# Patient Record
Sex: FEMALE | Race: WHITE | NOT HISPANIC OR LATINO | Employment: OTHER | URBAN - METROPOLITAN AREA
[De-identification: names, ages, dates, MRNs, and addresses within clinical notes are randomized per-mention and may not be internally consistent; named-entity substitution may affect disease eponyms.]

---

## 2022-07-13 ENCOUNTER — HOSPITAL ENCOUNTER (EMERGENCY)
Facility: HOSPITAL | Age: 87
Discharge: HOME | End: 2022-07-13
Attending: FAMILY MEDICINE
Payer: MEDICARE

## 2022-07-13 VITALS
BODY MASS INDEX: 24.11 KG/M2 | OXYGEN SATURATION: 95 % | HEIGHT: 66 IN | WEIGHT: 150 LBS | SYSTOLIC BLOOD PRESSURE: 137 MMHG | HEART RATE: 53 BPM | TEMPERATURE: 96 F | RESPIRATION RATE: 18 BRPM | DIASTOLIC BLOOD PRESSURE: 55 MMHG

## 2022-07-13 DIAGNOSIS — S01.01XA SCALP LACERATION, INITIAL ENCOUNTER: ICD-10-CM

## 2022-07-13 DIAGNOSIS — S09.90XA INJURY OF HEAD, INITIAL ENCOUNTER: Primary | ICD-10-CM

## 2022-07-13 PROCEDURE — 99285 EMERGENCY DEPT VISIT HI MDM: CPT | Mod: 25

## 2022-07-13 PROCEDURE — 25000003 PHARM REV CODE 250: Performed by: FAMILY MEDICINE

## 2022-07-13 PROCEDURE — 12002 RPR S/N/AX/GEN/TRNK2.6-7.5CM: CPT

## 2022-07-13 PROCEDURE — 63600175 PHARM REV CODE 636 W HCPCS: Performed by: FAMILY MEDICINE

## 2022-07-13 PROCEDURE — 90715 TDAP VACCINE 7 YRS/> IM: CPT | Performed by: FAMILY MEDICINE

## 2022-07-13 PROCEDURE — 90471 IMMUNIZATION ADMIN: CPT | Performed by: FAMILY MEDICINE

## 2022-07-13 RX ORDER — LIDOCAINE AND PRILOCAINE 25; 25 MG/G; MG/G
CREAM TOPICAL ONCE
Status: COMPLETED | OUTPATIENT
Start: 2022-07-13 | End: 2022-07-13

## 2022-07-13 RX ORDER — LIDOCAINE HYDROCHLORIDE 40 MG/ML
4 SOLUTION TOPICAL
Status: DISCONTINUED | OUTPATIENT
Start: 2022-07-13 | End: 2022-07-13

## 2022-07-13 RX ORDER — LIDOCAINE 40 MG/G
CREAM TOPICAL
Status: DISCONTINUED | OUTPATIENT
Start: 2022-07-13 | End: 2022-07-13

## 2022-07-13 RX ADMIN — LIDOCAINE AND PRILOCAINE: 25; 25 CREAM TOPICAL at 08:07

## 2022-07-13 RX ADMIN — TETANUS TOXOID, REDUCED DIPHTHERIA TOXOID AND ACELLULAR PERTUSSIS VACCINE, ADSORBED 0.5 ML: 5; 2.5; 8; 8; 2.5 SUSPENSION INTRAMUSCULAR at 08:07

## 2022-07-13 NOTE — ED PROVIDER NOTES
Encounter Date: 7/13/2022       History     Chief Complaint   Patient presents with    Fall     Fell coming out the restroom this morning Lac to the back of head noted unknown loc denies blood thinners     89-year-old presents by Dolores and lacerations to her hand.  Patient says she has some other vascular chest with her walker fell to the floor hitting his head denies loss of consciousness no syncope no chest pain no shortness of breath no weakness may complaint is a headache from the fall with a laceration posterior scalp no neck pain no chest pain no hip pain or extremity pain no other signs of injury.        Review of patient's allergies indicates:   Allergen Reactions    Ace inhibitors     Chlordiazepoxide hcl     Citalopram analogues     Diphenhydramine hcl     Hydrochlorothiazide     Hydrochlorothiazide (bulk)     Losartan     Mirabegron      Other reaction(s): Dizziness    Phenobarbital     Spironolactone     Trazodone      Other reaction(s): Dizziness     Past Medical History:   Diagnosis Date    Back pain     Coronary artery disease     Depression     Diabetes mellitus     Hypertension     Mixed hyperlipidemia      Past Surgical History:   Procedure Laterality Date    APPENDECTOMY      CHOLECYSTECTOMY      CORONARY ANGIOPLASTY WITH STENT PLACEMENT      TOTAL KNEE ARTHROPLASTY       History reviewed. No pertinent family history.  Social History     Tobacco Use    Smoking status: Never Smoker    Smokeless tobacco: Never Used   Substance Use Topics    Alcohol use: Not Currently     Review of Systems   Constitutional: Negative for fever.   HENT: Negative for sore throat.    Respiratory: Negative for shortness of breath.    Cardiovascular: Negative for chest pain.   Gastrointestinal: Negative for nausea.   Genitourinary: Negative for dysuria.   Musculoskeletal: Negative for back pain.   Skin: Positive for wound. Negative for rash.   Neurological: Negative for weakness.   Hematological:  Does not bruise/bleed easily.   All other systems reviewed and are negative.      Physical Exam     Initial Vitals [07/13/22 0745]   BP Pulse Resp Temp SpO2   126/77 76 18 96.4 °F (35.8 °C) 95 %      MAP       --         Physical Exam    Nursing note and vitals reviewed.  Constitutional: She appears well-developed and well-nourished. She is active.   HENT:   Head: Normocephalic and atraumatic.   Eyes: Conjunctivae, EOM and lids are normal. Pupils are equal, round, and reactive to light.   Neck: Trachea normal and phonation normal. Neck supple. No thyroid mass present.   Normal range of motion.  Cardiovascular: Normal rate, regular rhythm, normal heart sounds and normal pulses.   Pulmonary/Chest: Breath sounds normal.   Abdominal: Abdomen is soft. Bowel sounds are normal.   Musculoskeletal:         General: Normal range of motion.      Cervical back: Normal range of motion and neck supple.     Neurological: She is alert and oriented to person, place, and time. She has normal strength and normal reflexes.   Skin: Skin is warm, dry and intact.   3 cm laceration posterior scalp with hematoma on the scalp   Psychiatric: She has a normal mood and affect. Her speech is normal and behavior is normal. Judgment and thought content normal. Cognition and memory are normal.         ED Course   Lac Repair    Date/Time: 7/13/2022 9:07 AM  Performed by: Cem Blackburn MD  Authorized by: Cem Blackburn MD     Consent:     Consent obtained:  Verbal    Consent given by:  Patient    Risks, benefits, and alternatives were discussed: yes      Risks discussed:  Infection, poor cosmetic result and poor wound healing    Alternatives discussed:  No treatment  Universal protocol:     Procedure explained and questions answered to patient or proxy's satisfaction: yes    Anesthesia:     Anesthesia method:  Topical application    Topical anesthetic:  EMLA cream  Laceration details:     Location:  Scalp    Scalp location:  Occipital     Length (cm):  3  Exploration:     Hemostasis achieved with:  Direct pressure    Wound exploration: wound explored through full range of motion      Contaminated: no    Treatment:     Area cleansed with:  Povidone-iodine and saline    Amount of cleaning:  Standard    Irrigation method:  Pressure wash    Visualized foreign bodies/material removed: no      Debridement:  None  Skin repair:     Repair method:  Staples  Approximation:     Approximation:  Close  Repair type:     Repair type:  Simple  Post-procedure details:     Dressing:  Open (no dressing)    Procedure completion:  Tolerated well, no immediate complications      Labs Reviewed - No data to display       Imaging Results          CT Cervical Spine Without Contrast (Final result)  Result time 07/13/22 08:50:39    Final result by Georgiana Jones MD (07/13/22 08:50:39)                 Impression:      1. No fractures.    2. Ligament, spinal cord and/or vascular abnormalities cannot be excluded on the basis of this examination.      Electronically signed by: Georgiana Jones  Date:    07/13/2022  Time:    08:50             Narrative:    EXAMINATION:  CT CERVICAL SPINE WITHOUT CONTRAST    CLINICAL HISTORY:  fall;    TECHNIQUE:  Axial CT images were obtained through the cervical region.    Coronal and sagittal reconstructions obtained from the axial data.    Automatic exposure control was utilized to limit radiation dose.    Contrast: None.    Radiation Dose:    Total DLP:  mGy*cm    COMPARISON:  None    FINDINGS:  Fractures: None.    Alignment: Straightening of the normal cervical lordosis.  No subluxations.    Soft tissues: No abnormalities.    Degenerative changes:    Moderate canal narrowing at C3-4 and C5-6, mild elsewhere related to disc osteophyte complexes.    Moderate right and mild left foraminal narrowing at C2-3, severe bilaterally at C3-4, moderate right and severe left at C4-5, moderate bilaterally at C5-6 and C6-7, moderate right and severe left at  C7-T1 related to uncovertebral and facet hypertrophy.    Incidental findings:    Multinodular thyroid.                               CT Head Without Contrast (Final result)  Result time 07/13/22 08:46:27    Final result by Georgiana Jones MD (07/13/22 08:46:27)                 Impression:      No acute intracranial abnormalities.      Electronically signed by: Georgiana Jones  Date:    07/13/2022  Time:    08:46             Narrative:    EXAMINATION:  CT HEAD WITHOUT CONTRAST    CLINICAL HISTORY:  head trauma;    TECHNIQUE:  Axial scans were obtained from skull base to the vertex.    Coronal and sagittal reconstructions obtained from the axial data.    Automatic exposure control was utilized to limit radiation dose.    Contrast: None    Radiation Dose:    Total DLP: 1243 mGy*cm    COMPARISON:  None    FINDINGS:  Scalp/Skull:    Right parietal scalp laceration versus scar.    No calvarial fractures.    Hyperostosis frontalis interna.    Brain sulci: Appropriate for patient's age.    Ventricles: Normal in size and configuration. No hydrocephalus.    Extra-axial spaces:    Left frontal convexity 0.8 cm partially calcified dural-based mass (series 4, image 17), likely meningioma.    Otherwise no masses or fluid collections.    Parenchyma:    Discrete and confluent supratentorial white matter hypodensities are severe nonspecific chronic microangiopathic changes, statistically chronic microvascular ischemia.    No mass, hemorrhage or CT evidence of an acute vascular insult.    Dural sinuses: No abnormal densities.    Sellar/Suprasellar region: No abnormalities.    Skull base and Craniocervical junction: No abnormalities.    Incidental findings:    Carotid siphon and vertebrobasilar atherosclerotic vascular calcifications.    Status post bilateral cataract surgery.    Right frontal sinus polyp versus retention cyst.                                 Medications   Tdap (BOOSTRIX) vaccine injection 0.5 mL (0.5 mLs Intramuscular  Given 7/13/22 0836)   LIDOcaine-prilocaine cream ( Topical (Top) Given 7/13/22 0807)                          Clinical Impression:   Final diagnoses:  [S09.90XA] Injury of head, initial encounter (Primary)  [S01.01XA] Scalp laceration, initial encounter          ED Disposition Condition    Discharge Stable        ED Prescriptions     None        Follow-up Information     Follow up With Specialties Details Why Contact Info    Godfrey Browne MD Internal Medicine In 1 week  [address] [facility]  [address] [place] [id number]  [phone]             Cem Blackburn MD  07/13/22 0966

## 2022-07-13 NOTE — DISCHARGE INSTRUCTIONS
Return to emergency room if develops any confusion or change in mental status. Have staples removed in 10 days by family doctor or ER. Wash with soap and water twice daily. Watch for signs of infection

## 2022-10-06 ENCOUNTER — HOSPITAL ENCOUNTER (EMERGENCY)
Facility: HOSPITAL | Age: 87
Discharge: HOME | End: 2022-10-06
Attending: FAMILY MEDICINE
Payer: MEDICARE

## 2022-10-06 VITALS
DIASTOLIC BLOOD PRESSURE: 74 MMHG | TEMPERATURE: 97 F | SYSTOLIC BLOOD PRESSURE: 168 MMHG | RESPIRATION RATE: 18 BRPM | OXYGEN SATURATION: 96 % | HEART RATE: 64 BPM
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
## 2022-10-06 DIAGNOSIS — S51.811A SKIN TEAR OF FOREARM WITHOUT COMPLICATION, RIGHT, INITIAL ENCOUNTER: ICD-10-CM

## 2022-10-06 PROCEDURE — 99283 EMERGENCY DEPT VISIT LOW MDM: CPT | Mod: 25

## 2022-10-06 RX ORDER — AMOXICILLIN AND CLAVULANATE POTASSIUM 875; 125 MG/1; MG/1
1 TABLET, FILM COATED ORAL 2 TIMES DAILY
Qty: 14 TABLET | Refills: 0 | Status: ON HOLD | OUTPATIENT
Start: 2022-10-06 | End: 2023-06-17

## 2022-10-06 NOTE — ED PROVIDER NOTES
Encounter Date: 10/6/2022       History     Chief Complaint   Patient presents with    Arm Injury     Amb to ED 2 states got out of bed fell right arm numerous skin tears noted bleeding controlled denies jpain      89-year-old female fell getting out of bed this morning skin tears to the right forearm hand area complains of pain in the right wrist right elbow mild-to-moderate pain began this morning no other change      Review of patient's allergies indicates:   Allergen Reactions    Ace inhibitors     Chlordiazepoxide hcl     Citalopram analogues     Diphenhydramine hcl     Hydrochlorothiazide     Hydrochlorothiazide (bulk)     Losartan     Mirabegron      Other reaction(s): Dizziness    Phenobarbital     Spironolactone     Trazodone      Other reaction(s): Dizziness     Past Medical History:   Diagnosis Date    Back pain     Coronary artery disease     Depression     Diabetes mellitus     Hypertension     Mixed hyperlipidemia      Past Surgical History:   Procedure Laterality Date    APPENDECTOMY      CHOLECYSTECTOMY      CORONARY ANGIOPLASTY WITH STENT PLACEMENT      TOTAL KNEE ARTHROPLASTY       History reviewed. No pertinent family history.  Social History     Tobacco Use    Smoking status: Never    Smokeless tobacco: Never   Substance Use Topics    Alcohol use: Not Currently     Review of Systems   Constitutional:  Negative for fever.   HENT:  Negative for sore throat.    Respiratory:  Negative for shortness of breath.    Cardiovascular:  Negative for chest pain.   Gastrointestinal:  Negative for nausea.   Genitourinary:  Negative for dysuria.   Musculoskeletal:  Negative for back pain.   Skin:  Positive for wound. Negative for rash.   Neurological:  Negative for weakness.   Hematological:  Does not bruise/bleed easily.     Physical Exam     Initial Vitals [10/06/22 0545]   BP Pulse Resp Temp SpO2   (!) 168/74 64 18 97.3 °F (36.3 °C) 96 %      MAP       --         Physical Exam    Nursing note and vitals  reviewed.  Constitutional: She appears well-developed and well-nourished. She is active.   HENT:   Head: Normocephalic and atraumatic.   Eyes: Conjunctivae, EOM and lids are normal. Pupils are equal, round, and reactive to light.   Neck: Trachea normal and phonation normal. Neck supple. No thyroid mass present.   Normal range of motion.  Cardiovascular:  Normal rate, regular rhythm, normal heart sounds and normal pulses.           Pulmonary/Chest: Breath sounds normal.   Abdominal: Abdomen is soft. Bowel sounds are normal.   Musculoskeletal:         General: Normal range of motion.      Cervical back: Normal range of motion and neck supple.     Neurological: She is alert and oriented to person, place, and time. She has normal strength and normal reflexes.   Skin: Skin is warm, dry and intact.   The skin tears to the right forearm and hand   Psychiatric: She has a normal mood and affect. Her speech is normal and behavior is normal. Judgment and thought content normal. Cognition and memory are normal.       ED Course   Procedures  Labs Reviewed - No data to display       Imaging Results              X-Ray Elbow Complete Right (Final result)  Result time 10/06/22 06:23:29      Final result by Mary Zelaya MD (10/06/22 06:23:29)                   Impression:      No acute bony abnormality      Electronically signed by: Mary Zelaya  Date:    10/06/2022  Time:    06:23               Narrative:    EXAMINATION:  XR ELBOW COMPLETE 3 VIEW RIGHT    CLINICAL HISTORY:  Unspecified fall, initial encounter    COMPARISON:  None.    FINDINGS:  There is no acute fracture identified.  There is no elbow joint effusion.  Mild soft tissue swelling is noted.                                       X-Ray Wrist Complete Right (Final result)  Result time 10/06/22 06:10:20      Final result by Mary Zelaya MD (10/06/22 06:10:20)                   Impression:      No acute bony abnormality      Electronically signed  by: Marychasity Patellory  Date:    10/06/2022  Time:    06:10               Narrative:    EXAMINATION:  XR WRIST COMPLETE 3 VIEWS RIGHT    CLINICAL HISTORY:  Unspecified fall, initial encounter    COMPARISON:  None.    FINDINGS:  There is no acute fracture or malalignment.  There are degenerative changes at the 1st CMC joint with joint space narrowing and osteophyte formation.  The soft tissues are unremarkable.                                       Medications - No data to display                           Clinical Impression:   Final diagnoses:  [W19.XXXA] Fall  [W19.XXXA] Fall, initial encounter (Primary)  [S51.811A] Skin tear of forearm without complication, right, initial encounter        ED Disposition Condition    Discharge Stable          ED Prescriptions       Medication Sig Dispense Start Date End Date Auth. Provider    amoxicillin-clavulanate 875-125mg (AUGMENTIN) 875-125 mg per tablet Take 1 tablet by mouth 2 (two) times daily. 14 tablet 10/6/2022 -- Cem Blackburn MD          Follow-up Information       Follow up With Specialties Details Why Contact Info    Godfrey Browne MD Internal Medicine In 1 day  [address] [facility]  [address]  [facility] [address]  [phone]               Cem Blackburn MD  10/06/22 0631

## 2022-10-11 ENCOUNTER — HOSPITAL ENCOUNTER (OUTPATIENT)
Dept: WOUND CARE | Facility: HOSPITAL | Age: 87
Discharge: HOME | End: 2022-10-11
Attending: PEDIATRICS
Payer: MEDICARE

## 2022-10-11 VITALS
HEART RATE: 59 BPM | DIASTOLIC BLOOD PRESSURE: 74 MMHG | SYSTOLIC BLOOD PRESSURE: 147 MMHG | RESPIRATION RATE: 18 BRPM | TEMPERATURE: 98 F | OXYGEN SATURATION: 98 %

## 2022-10-11 DIAGNOSIS — S51.811A SKIN TEAR OF RIGHT FOREARM WITHOUT COMPLICATION, INITIAL ENCOUNTER: Primary | ICD-10-CM

## 2022-10-11 DIAGNOSIS — I10 PRIMARY HYPERTENSION: ICD-10-CM

## 2022-10-11 PROCEDURE — 97597 DBRDMT OPN WND 1ST 20 CM/<: CPT | Mod: ,,, | Performed by: PEDIATRICS

## 2022-10-11 PROCEDURE — 99203 PR OFFICE/OUTPT VISIT, NEW, LEVL III, 30-44 MIN: ICD-10-PCS | Mod: 25,,, | Performed by: PEDIATRICS

## 2022-10-11 PROCEDURE — 87077 CULTURE AEROBIC IDENTIFY: CPT | Performed by: PEDIATRICS

## 2022-10-11 PROCEDURE — 97597 DBRDMT OPN WND 1ST 20 CM/<: CPT

## 2022-10-11 PROCEDURE — 99214 OFFICE O/P EST MOD 30 MIN: CPT | Mod: 25

## 2022-10-11 PROCEDURE — 97597 DEBRIDEMENT: ICD-10-PCS | Mod: ,,, | Performed by: PEDIATRICS

## 2022-10-11 PROCEDURE — 99203 OFFICE O/P NEW LOW 30 MIN: CPT | Mod: 25,,, | Performed by: PEDIATRICS

## 2022-10-11 PROCEDURE — 99203 OFFICE O/P NEW LOW 30 MIN: CPT | Mod: 25

## 2022-10-11 RX ORDER — ATORVASTATIN CALCIUM 20 MG/1
TABLET, FILM COATED ORAL
Status: ON HOLD | COMMUNITY
End: 2023-06-17

## 2022-10-11 RX ORDER — LIDOCAINE HYDROCHLORIDE 20 MG/ML
JELLY TOPICAL
Status: DISPENSED
Start: 2022-10-11 | End: 2022-10-11

## 2022-10-11 RX ORDER — AMLODIPINE BESYLATE 10 MG/1
TABLET ORAL
Status: ON HOLD | COMMUNITY
End: 2023-06-18 | Stop reason: HOSPADM

## 2022-10-11 RX ORDER — ALISKIREN 300 MG/1
300 TABLET, FILM COATED ORAL DAILY
COMMUNITY
Start: 2022-09-27

## 2022-10-11 RX ORDER — DONEPEZIL HYDROCHLORIDE 5 MG/1
TABLET, FILM COATED ORAL
COMMUNITY

## 2022-10-11 RX ORDER — METOPROLOL SUCCINATE 25 MG/1
TABLET, EXTENDED RELEASE ORAL
Status: ON HOLD | COMMUNITY
End: 2023-06-17

## 2022-10-11 NOTE — PATIENT INSTRUCTIONS
Leave dressing in place until you return for re-evaluation/dressing change on Friday in wound center  Reinforce dressing if needed    Continue taking Augmentin as prescribed.   A culture was performed in wound care center today.  Antibiotics may be changed if needed when final culture results returned.

## 2022-10-11 NOTE — PROGRESS NOTES
Subjective:       Patient ID: Shanita Chino is a 89 y.o. female.    Chief Complaint: Wound Consult (Skin tears to R forearm after fall on 10/6/22.  Seen at [facility] ER on 10/6/22, started on Augmentin at that time.  Here for evaluation and treatment)    HPI  Review of Systems      Objective:      Temp:  [97.5 °F (36.4 °C)]   Pulse:  [59]   Resp:  [18]   BP: (147)/(74)   SpO2:  [98 %]   Physical Exam       Altered Skin Integrity 10/11/22 0926 Right proximal;lower Arm Skin Tear (Active)   10/11/22 0926   Altered Skin Integrity Present on Admission:    Side: Right   Orientation: proximal;lower   Location: Arm   Wound Number:    Is this injury device related?: No   Primary Wound Type: Skin tear   Description of Altered Skin Integrity:    Ankle-Brachial Index:    Pulses:    Removal Indication and Assessment:    Wound Outcome:    (Retired) Wound Length (cm):    (Retired) Wound Width (cm):    (Retired) Depth (cm):    Wound Description (Comments):    Removal Indications:    Wound Image   10/11/22 0936   Dressing Appearance Moist drainage 10/11/22 0936   Drainage Amount Moderate 10/11/22 0936   Drainage Characteristics/Odor Tan;Malodorous;Bleeding controlled 10/11/22 0936   Appearance Red;Not granulating;White;Tan;Fibrin;Ecchymotic 10/11/22 0936   Periwound Area Ecchymotic;Other (see comments) 10/11/22 0936   Wound Edges Irregular;Rolled/closed 10/11/22 0936   Wound Length (cm) 4.5 cm 10/11/22 0936   Wound Width (cm) 5 cm 10/11/22 0936   Wound Depth (cm) 0.1 cm 10/11/22 0936   Wound Volume (cm^3) 2.25 cm^3 10/11/22 0936   Wound Surface Area (cm^2) 22.5 cm^2 10/11/22 0936   Care Cleansed with:;Soap and water;Applied:;Other (see comments) 10/11/22 0936            Altered Skin Integrity 10/11/22 0926 Right lower;distal Arm (Active)   10/11/22 0926   Altered Skin Integrity Present on Admission:    Side: Right   Orientation: lower;distal   Location: Arm   Wound Number:    Is this injury device related?:    Primary Wound Type:     Description of Altered Skin Integrity:    Ankle-Brachial Index:    Pulses:    Removal Indication and Assessment:    Wound Outcome:    (Retired) Wound Length (cm):    (Retired) Wound Width (cm):    (Retired) Depth (cm):    Wound Description (Comments):    Removal Indications:    Wound Image   10/11/22 0936   Dressing Appearance Moist drainage 10/11/22 0936   Drainage Amount Moderate 10/11/22 0936   Drainage Characteristics/Odor Tan;Malodorous;Bleeding controlled 10/11/22 0936   Appearance Red;Not granulating;Tan;White;Fibrin;Ecchymotic 10/11/22 0936   Periwound Area Ecchymotic;Other (see comments) 10/11/22 0936   Wound Edges Irregular;Rolled/closed 10/11/22 0936   Wound Length (cm) 8.7 cm 10/11/22 0936   Wound Width (cm) 3.5 cm 10/11/22 0936   Wound Depth (cm) 0.1 cm 10/11/22 0936   Wound Volume (cm^3) 3.045 cm^3 10/11/22 0936   Wound Surface Area (cm^2) 30.45 cm^2 10/11/22 0936   Care Cleansed with:;Soap and water;Applied:;Other (see comments) 10/11/22 0936         Assessment:     Patient arrives today with new injury to right forearm.  These are to skin tears that she acquired after falling out of bed on 10/06/2022.  These were seen in the emergency room area was cleaned and Adaptic and Steri-Strips were applied with Tegaderm.  Today the area is unwrapped and there was some odor and Biofilm that was noted.  There was some edges that were rolled in both skin tears.  Xylocaine was applied to the wounds and these were cleaned the Biofilm removed.  Because of the rolled edges of the skin A selective debridement was done and the nonviable skin was removed.  See procedure note.  Patient will continue on current antibiotics.  A new wound culture was taken.  Area was cleaned and Aquacel Ag was applied to the open areas and foam dressing was applied.  Patient will return in 3 days for a dressing change and in 1 week with for physician visit.    ICD-10-CM ICD-9-CM   1. Skin tear of right forearm without complication,  initial encounter  S51.811A 881.00   2. Primary hypertension  I10 401.9         Plan:   Tissue pathology and/or culture taken:  [x] Yes [] No   Sharp debridement performed:   [x] Yes [] No   Labs ordered this visit:   [] Yes [x] No   Imaging ordered this visit:   [] Yes [x] No           Orders Placed This Encounter   Procedures    Wound Culture    Change dressing     Cleanse wound with: wound cleanser, NS or soap and water  Lidocaine: 2% topical gel prn prior to wound care   Silver nitrate: prn   Periwound care: skin prep periwound prn   Primary dressing: aquacel ag  Secondary dressing: foam dressing    Frequency: change twice weekly     Standing Status:   Standing     Number of Occurrences:   6     Standing Expiration Date:   12/11/2022        Follow up in about 3 days (around 10/14/2022) for Nurse visit Friday, md visit next Tuesday.

## 2022-10-11 NOTE — PROCEDURES
"Debridement    Date/Time: 10/11/2022 9:08 AM  Performed by: Randy Ross MD  Authorized by: Randy Ross MD     Time out: Immediately prior to procedure a "time out" was called to verify the correct patient, procedure, equipment, support staff and site/side marked as required.    Consent Done?:  Yes (Written)    Preparation: Patient was prepped and draped in usual sterile fashion    Local anesthesia used?: Yes    Anesthesia:  Local infiltration  Local anesthetic:  Topical anesthetic    Wound Details:    Location:  Right arm    Type of Debridement:  Non-excisional       Length (cm):  8       Area (sq cm):  16       Width (cm):  2       Percent Debrided (%):  50       Depth (cm):  0.1       Total Area Debrided (sq cm):  8    Depth of debridement:  Epidermis/Dermis    Tissue debrided:  Epidermis and Dermis    Devitalized tissue debrided:  Other    Instruments:  Forceps and Scissors    Bleeding:  Minimal  Hemostasis Achieved: Yes    Method Used:  Pressure  Patient tolerance:  Patient tolerated the procedure well with no immediate complications  "

## 2022-10-13 DIAGNOSIS — S51.811A SKIN TEAR OF RIGHT FOREARM WITHOUT COMPLICATION: Primary | ICD-10-CM

## 2022-10-13 LAB — BACTERIA SPEC CULT: ABNORMAL

## 2022-10-13 RX ORDER — SULFAMETHOXAZOLE AND TRIMETHOPRIM 800; 160 MG/1; MG/1
1 TABLET ORAL 2 TIMES DAILY
Qty: 20 TABLET | Refills: 0 | Status: SHIPPED | OUTPATIENT
Start: 2022-10-13 | End: 2022-10-23

## 2022-10-13 NOTE — PROGRESS NOTES
Culture results returned.  Resistant to Augmentin. Verbal order given for pt to d/c Augmentin,  Bactrim Ds and take BID x 10 days per Dr. Ross.  Sent electronically to pharmacy.  Notified Charlene of medication change per telephone.  Verbalized understanding.

## 2022-10-14 ENCOUNTER — HOSPITAL ENCOUNTER (OUTPATIENT)
Dept: WOUND CARE | Facility: HOSPITAL | Age: 87
Discharge: HOME | End: 2022-10-14
Attending: PEDIATRICS
Payer: MEDICARE

## 2022-10-14 VITALS
RESPIRATION RATE: 18 BRPM | OXYGEN SATURATION: 96 % | DIASTOLIC BLOOD PRESSURE: 62 MMHG | TEMPERATURE: 99 F | HEART RATE: 55 BPM | SYSTOLIC BLOOD PRESSURE: 126 MMHG

## 2022-10-14 DIAGNOSIS — S51.811A SKIN TEAR OF RIGHT FOREARM WITHOUT COMPLICATION, INITIAL ENCOUNTER: ICD-10-CM

## 2022-10-14 PROCEDURE — 99213 OFFICE O/P EST LOW 20 MIN: CPT

## 2022-10-14 NOTE — PROGRESS NOTES
[facility]  [address] [place] [address] [place] [address]      Subjective:     Patient seen in clinic today.  Dressing changed as ordered.      Assessment:          Altered Skin Integrity 10/11/22 0926 Right proximal;lower Arm Skin Tear (Active)   10/11/22 0926   Altered Skin Integrity Present on Admission:    Side: Right   Orientation: proximal;lower   Location: Arm   Wound Number:    Is this injury device related?: No   Primary Wound Type: Skin tear   Description of Altered Skin Integrity:    Ankle-Brachial Index:    Pulses:    Removal Indication and Assessment:    Wound Outcome:    (Retired) Wound Length (cm):    (Retired) Wound Width (cm):    (Retired) Depth (cm):    Wound Description (Comments):    Removal Indications:    Wound Image   10/14/22 0912   Dressing Appearance Moist drainage;Intact 10/14/22 0912   Drainage Amount Small 10/14/22 0912   Drainage Characteristics/Odor Serosanguineous;No odor 10/14/22 0912   Appearance Red;Slough;Granulating 10/14/22 0912   Periwound Area Ecchymotic 10/14/22 0912   Wound Edges Irregular 10/14/22 0912   Wound Length (cm) 3.5 cm 10/14/22 0912   Wound Width (cm) 3.3 cm 10/14/22 0912   Wound Depth (cm) 0.1 cm 10/14/22 0912   Wound Volume (cm^3) 1.155 cm^3 10/14/22 0912   Wound Surface Area (cm^2) 11.55 cm^2 10/14/22 0912   Care Cleansed with:;Antimicrobial agent;Sterile normal saline 10/14/22 0912   Dressing Applied;Non-adherent;Hydrofiber;Silver;Foam 10/14/22 0912            Altered Skin Integrity 10/11/22 0926 Right lower;distal Arm (Active)   10/11/22 0926   Altered Skin Integrity Present on Admission:    Side: Right   Orientation: lower;distal   Location: Arm   Wound Number:    Is this injury device related?:    Primary Wound Type:    Description of Altered Skin Integrity:    Ankle-Brachial Index:    Pulses:    Removal Indication and Assessment:    Wound Outcome:    (Retired) Wound Length (cm):    (Retired) Wound Width (cm):    (Retired) Depth  (cm):    Wound Description (Comments):    Removal Indications:    Wound Image    10/14/22 0912   Dressing Appearance Intact;Moist drainage 10/14/22 0912   Drainage Amount Small 10/14/22 0912   Drainage Characteristics/Odor Serosanguineous;No odor 10/14/22 0912   Appearance Red;Slough;Granulating;Other (see comments) 10/14/22 0912   Periwound Area Ecchymotic 10/14/22 0912   Wound Edges Irregular 10/14/22 0912   Wound Length (cm) 7.9 cm 10/14/22 0912   Wound Width (cm) 3.2 cm 10/14/22 0912   Wound Depth (cm) 0.1 cm 10/14/22 0912   Wound Volume (cm^3) 2.528 cm^3 10/14/22 0912   Wound Surface Area (cm^2) 25.28 cm^2 10/14/22 0912   Care Cleansed with:;Sterile normal saline;Antimicrobial agent 10/14/22 0912   Dressing Applied;Hydrocolloid;Sodium chloride impregnated;Non-adherent;Foam 10/14/22 0912         ICD-10-CM ICD-9-CM   1. Skin tear of right forearm without complication, initial encounter  S51.811A 881.00         Plan:           Orders Placed This Encounter   Procedures    Change dressing     Cleanse wound with: wound cleanser, NS or soap and water  Lidocaine: 2% topical gel prn prior to wound care   Silver nitrate: prn   Periwound care: skin prep periwound prn   Primary dressing: aquacel ag  Secondary dressing: foam dressing    Frequency: change twice weekly     Standing Status:   Standing     Number of Occurrences:   1

## 2022-10-14 NOTE — PATIENT INSTRUCTIONS
Leave dressing in place until you return for re-evaluation/dressing change on Friday in wound center  Reinforce dressing if needed  Continue taking Bactrim as directed until complete.

## 2022-10-18 ENCOUNTER — HOSPITAL ENCOUNTER (OUTPATIENT)
Dept: WOUND CARE | Facility: HOSPITAL | Age: 87
Discharge: HOME | End: 2022-10-18
Attending: PEDIATRICS
Payer: MEDICARE

## 2022-10-18 VITALS
RESPIRATION RATE: 18 BRPM | DIASTOLIC BLOOD PRESSURE: 71 MMHG | SYSTOLIC BLOOD PRESSURE: 143 MMHG | OXYGEN SATURATION: 95 % | TEMPERATURE: 98 F | HEART RATE: 58 BPM

## 2022-10-18 DIAGNOSIS — S51.811A SKIN TEAR OF RIGHT FOREARM WITHOUT COMPLICATION, INITIAL ENCOUNTER: ICD-10-CM

## 2022-10-18 DIAGNOSIS — S51.811S SKIN TEAR OF RIGHT FOREARM WITHOUT COMPLICATION, SEQUELA: Primary | ICD-10-CM

## 2022-10-18 PROCEDURE — 99213 OFFICE O/P EST LOW 20 MIN: CPT | Mod: ,,, | Performed by: PEDIATRICS

## 2022-10-18 PROCEDURE — 99213 OFFICE O/P EST LOW 20 MIN: CPT

## 2022-10-18 PROCEDURE — 99213 PR OFFICE/OUTPT VISIT, EST, LEVL III, 20-29 MIN: ICD-10-PCS | Mod: ,,, | Performed by: PEDIATRICS

## 2022-10-18 NOTE — PROGRESS NOTES
Subjective:       Patient ID: Shanita Chino is a 89 y.o. female.    Chief Complaint: Non-healing Wound Follow Up (Infected R forearm skin tear, follow up with md for re-evaluation and treatment)    HPI  Review of Systems      Objective:      Temp:  [97.5 °F (36.4 °C)]   Pulse:  [58]   Resp:  [18]   BP: (143)/(71)   SpO2:  [95 %]   Physical Exam       Altered Skin Integrity 10/11/22 0926 Right proximal;lower Arm Skin Tear (Active)   10/11/22 0926   Altered Skin Integrity Present on Admission:    Side: Right   Orientation: proximal;lower   Location: Arm   Wound Number:    Is this injury device related?: No   Primary Wound Type: Skin tear   Description of Altered Skin Integrity:    Ankle-Brachial Index:    Pulses:    Removal Indication and Assessment:    Wound Outcome:    (Retired) Wound Length (cm):    (Retired) Wound Width (cm):    (Retired) Depth (cm):    Wound Description (Comments):    Removal Indications:    Wound Image   10/18/22 1026   Dressing Appearance Moist drainage;Intact 10/18/22 1026   Drainage Amount Small 10/18/22 1026   Drainage Characteristics/Odor Serosanguineous 10/18/22 1026   Appearance Red;Epithelialization;Granulating 10/18/22 1026   Periwound Area Ecchymotic 10/18/22 1026   Wound Edges Irregular 10/18/22 1026   Wound Length (cm) 3 cm 10/18/22 1026   Wound Width (cm) 2.3 cm 10/18/22 1026   Wound Depth (cm) 0.1 cm 10/18/22 1026   Wound Volume (cm^3) 0.69 cm^3 10/18/22 1026   Wound Surface Area (cm^2) 6.9 cm^2 10/18/22 1026   Care Cleansed with:;Antimicrobial agent;Sterile normal saline 10/18/22 1026   Dressing Applied;Collagen;Non-adherent;Foam 10/18/22 1026            Altered Skin Integrity 10/11/22 0926 Right lower;distal Arm (Active)   10/11/22 0926   Altered Skin Integrity Present on Admission:    Side: Right   Orientation: lower;distal   Location: Arm   Wound Number:    Is this injury device related?:    Primary Wound Type:    Description of Altered Skin Integrity:    Ankle-Brachial  Index:    Pulses:    Removal Indication and Assessment:    Wound Outcome:    (Retired) Wound Length (cm):    (Retired) Wound Width (cm):    (Retired) Depth (cm):    Wound Description (Comments):    Removal Indications:    Wound Image   10/18/22 1026   Dressing Appearance Intact;Moist drainage 10/18/22 1026   Drainage Amount Small 10/18/22 1026   Drainage Characteristics/Odor Serosanguineous 10/18/22 1026   Appearance Red;Granulating 10/18/22 1026   Periwound Area Ecchymotic 10/18/22 1026   Wound Edges Irregular 10/18/22 1026   Wound Length (cm) 8.3 cm 10/18/22 1026   Wound Width (cm) 1.2 cm 10/18/22 1026   Wound Depth (cm) 0.1 cm 10/18/22 1026   Wound Volume (cm^3) 0.996 cm^3 10/18/22 1026   Wound Surface Area (cm^2) 9.96 cm^2 10/18/22 1026   Care Cleansed with:;Antimicrobial agent;Sterile normal saline 10/18/22 1026   Dressing Applied;Collagen;Foam;Non-adherent 10/18/22 1026         Assessment:     Today all 3 areas of skin avulsion are improving.  All wounds are well granulated.  There is good adherence of the edges of the wounds.  Area was cleaned and collagen and Adaptic applied with foam dressing.  Patient is currently taking her antibiotics.  There was no drainage today.  Patient return in 3 days for dressing change and in 1 week for physician visit.    ICD-10-CM ICD-9-CM   1. Skin tear of right forearm without complication, initial encounter  S51.811A 881.00         Plan:   Tissue pathology and/or culture taken:  [] Yes [x] No   Sharp debridement performed:   [] Yes [x] No   Labs ordered this visit:   [] Yes [x] No   Imaging ordered this visit:   [] Yes [x] No           Orders Placed This Encounter   Procedures    Change dressing     Cleanse wound with: wound cleanser, NS or soap and water  Lidocaine: 2% topical gel prn prior to wound care   Silver nitrate: prn   Periwound care: skin prep periwound prn   Primary dressing: collagen/adaptic  Secondary dressing: foam dressing    Frequency: change twice weekly      Standing Status:   Standing     Number of Occurrences:   1        Follow up in about 3 days (around 10/21/2022) for nurse visit, md visit 1 week.

## 2022-10-18 NOTE — PATIENT INSTRUCTIONS
Cleanse wound with: wound cleanser, NS or soap and water   Lidocaine: 2% topical gel prn prior to wound care   Silver nitrate: prn   Periwound care: skin prep periwound prn   Primary dressing: aquacel ag   Secondary dressing: foam dressing

## 2022-10-21 ENCOUNTER — HOSPITAL ENCOUNTER (OUTPATIENT)
Dept: WOUND CARE | Facility: HOSPITAL | Age: 87
Discharge: HOME | End: 2022-10-21
Attending: PEDIATRICS
Payer: MEDICARE

## 2022-10-21 VITALS
DIASTOLIC BLOOD PRESSURE: 61 MMHG | HEART RATE: 57 BPM | TEMPERATURE: 98 F | OXYGEN SATURATION: 95 % | SYSTOLIC BLOOD PRESSURE: 119 MMHG | RESPIRATION RATE: 18 BRPM

## 2022-10-21 DIAGNOSIS — S51.811A SKIN TEAR OF RIGHT FOREARM WITHOUT COMPLICATION, INITIAL ENCOUNTER: ICD-10-CM

## 2022-10-21 PROCEDURE — 99212 OFFICE O/P EST SF 10 MIN: CPT

## 2022-10-21 NOTE — PROGRESS NOTES
[facility]  [address] [place] [address]      Subjective:     Patient seen in clinic today.  Dressing changed as ordered.      Assessment:          Altered Skin Integrity 10/11/22 0926 Right proximal;lower Arm Skin Tear (Active)   10/11/22 0926   Altered Skin Integrity Present on Admission:    Side: Right   Orientation: proximal;lower   Location: Arm   Wound Number:    Is this injury device related?: No   Primary Wound Type: Skin tear   Description of Altered Skin Integrity:    Ankle-Brachial Index:    Pulses:    Removal Indication and Assessment:    Wound Outcome:    (Retired) Wound Length (cm):    (Retired) Wound Width (cm):    (Retired) Depth (cm):    Wound Description (Comments):    Removal Indications:    Wound Image   10/21/22 1018   Dressing Appearance Intact;Moist drainage 10/21/22 1018   Drainage Amount Small 10/21/22 1018   Drainage Characteristics/Odor Serous;No odor 10/21/22 1018   Appearance Red;Not granulating 10/21/22 1018   Periwound Area Intact 10/21/22 1018   Wound Edges Defined 10/21/22 1018   Wound Length (cm) 1 cm 10/21/22 1018   Wound Width (cm) 0.6 cm 10/21/22 1018   Wound Depth (cm) 0.1 cm 10/21/22 1018   Wound Volume (cm^3) 0.06 cm^3 10/21/22 1018   Wound Surface Area (cm^2) 0.6 cm^2 10/21/22 1018   Care Cleansed with:;Sterile normal saline 10/21/22 1018   Dressing Applied;Collagen;Non-adherent;Foam 10/21/22 1018            Altered Skin Integrity 10/11/22 0926 Right lower;distal Arm (Active)   10/11/22 0926   Altered Skin Integrity Present on Admission:    Side: Right   Orientation: lower;distal   Location: Arm   Wound Number:    Is this injury device related?:    Primary Wound Type:    Description of Altered Skin Integrity:    Ankle-Brachial Index:    Pulses:    Removal Indication and Assessment:    Wound Outcome:    (Retired) Wound Length (cm):    (Retired) Wound Width (cm):    (Retired) Depth (cm):    Wound Description (Comments):    Removal  Indications:    Wound Image   10/21/22 1018   Dressing Appearance Intact;Moist drainage 10/21/22 1018   Drainage Amount Small 10/21/22 1018   Drainage Characteristics/Odor Serosanguineous;No odor 10/21/22 1018   Appearance Red;Not granulating 10/21/22 1018   Periwound Area Intact 10/21/22 1018   Wound Edges Defined 10/21/22 1018   Wound Length (cm) 6.5 cm 10/21/22 1018   Wound Width (cm) 1 cm 10/21/22 1018   Wound Depth (cm) 0.1 cm 10/21/22 1018   Wound Volume (cm^3) 0.65 cm^3 10/21/22 1018   Wound Surface Area (cm^2) 6.5 cm^2 10/21/22 1018   Care Cleansed with:;Sterile normal saline 10/21/22 1018   Dressing Applied;Collagen;Non-adherent;Foam 10/21/22 1018         ICD-10-CM ICD-9-CM   1. Skin tear of right forearm without complication, initial encounter  S51.811A 881.00            Orders Placed This Encounter   Procedures    Change dressing     Cleanse wound with: wound cleanser, NS or soap and water  Lidocaine: 2% topical gel prn prior to wound care   Silver nitrate: prn   Periwound care: skin prep periwound prn   Primary dressing: collagen  Secondary dressing: foam dressing    Frequency: change twice weekly     Standing Status:   Standing     Number of Occurrences:   1     Standing Expiration Date:   10/21/2022

## 2022-10-21 NOTE — PATIENT INSTRUCTIONS
Cleanse wound with: wound cleanser, NS or soap and water   Lidocaine: 2% topical gel prn prior to wound care   Silver nitrate: prn   Periwound care: skin prep periwound prn   Primary dressing: aquacel ag   Secondary dressing: foam dressing     Frequency: change twice weekly

## 2022-10-23 ENCOUNTER — HOSPITAL ENCOUNTER (EMERGENCY)
Facility: HOSPITAL | Age: 87
Discharge: HOME | End: 2022-10-23
Attending: SPECIALIST
Payer: MEDICARE

## 2022-10-23 DIAGNOSIS — S02.2XXA CLOSED FRACTURE OF NASAL BONE, INITIAL ENCOUNTER: ICD-10-CM

## 2022-10-23 DIAGNOSIS — S02.401A CLOSED FRACTURE OF MAXILLARY SINUS, INITIAL ENCOUNTER: ICD-10-CM

## 2022-10-23 DIAGNOSIS — S01.21XA COMPLEX LACERATION OF NOSE, INITIAL ENCOUNTER: Primary | ICD-10-CM

## 2022-10-23 PROCEDURE — 12014 RPR F/E/E/N/L/M 5.1-7.5 CM: CPT

## 2022-10-23 PROCEDURE — 99285 EMERGENCY DEPT VISIT HI MDM: CPT | Mod: 25

## 2022-10-23 PROCEDURE — 63600175 PHARM REV CODE 636 W HCPCS: Performed by: SPECIALIST

## 2022-10-23 PROCEDURE — 90715 TDAP VACCINE 7 YRS/> IM: CPT | Performed by: SPECIALIST

## 2022-10-23 PROCEDURE — 90471 IMMUNIZATION ADMIN: CPT | Performed by: SPECIALIST

## 2022-10-23 PROCEDURE — 25000003 PHARM REV CODE 250: Performed by: SPECIALIST

## 2022-10-23 RX ORDER — OXYMETAZOLINE HCL 0.05 %
1 SPRAY, NON-AEROSOL (ML) NASAL
Status: COMPLETED | OUTPATIENT
Start: 2022-10-23 | End: 2022-10-23

## 2022-10-23 RX ORDER — TRAMADOL HYDROCHLORIDE 50 MG/1
50 TABLET ORAL EVERY 6 HOURS PRN
Qty: 20 TABLET | Refills: 0 | Status: ON HOLD | OUTPATIENT
Start: 2022-10-23 | End: 2023-06-17

## 2022-10-23 RX ORDER — TRAMADOL HYDROCHLORIDE 50 MG/1
50 TABLET ORAL
Status: COMPLETED | OUTPATIENT
Start: 2022-10-23 | End: 2022-10-23

## 2022-10-23 RX ADMIN — Medication 1 SPRAY: at 10:10

## 2022-10-23 RX ADMIN — TETANUS TOXOID, REDUCED DIPHTHERIA TOXOID AND ACELLULAR PERTUSSIS VACCINE, ADSORBED 0.5 ML: 5; 2.5; 8; 8; 2.5 SUSPENSION INTRAMUSCULAR at 08:10

## 2022-10-23 RX ADMIN — TRAMADOL HYDROCHLORIDE 50 MG: 50 TABLET, COATED ORAL at 10:10

## 2022-10-24 VITALS
HEART RATE: 62 BPM | TEMPERATURE: 98 F | RESPIRATION RATE: 16 BRPM | DIASTOLIC BLOOD PRESSURE: 66 MMHG | SYSTOLIC BLOOD PRESSURE: 134 MMHG | OXYGEN SATURATION: 95 %

## 2022-10-24 NOTE — ED PROVIDER NOTES
Encounter Date: 10/23/2022       History     Chief Complaint   Patient presents with    Fall     Patient awake alert oriented to name and place only Hx Dementia sttes reached for the light and fell hitting face on bricks large amt of bleeding noted on dressing and clothing denies chest pain or dizziness prior to fall      Patient lost her balance while going outside to change a light bulb when she reached up she fell over falling onto her face on bricks; she presents with a large laceration to her nose with bleeding; questionable loss of consciousness; patient with dementia but is oriented to her name and place; no blood thinners    The history is provided by the patient and a relative.   Review of patient's allergies indicates:   Allergen Reactions    Ace inhibitors     Aspirin     Chlordiazepoxide hcl     Citalopram analogues     Diphenhydramine hcl     Hydrochlorothiazide     Hydrochlorothiazide (bulk)     Losartan     Mirabegron      Other reaction(s): Dizziness    Phenobarbital     Spironolactone     Trazodone      Other reaction(s): Dizziness     Past Medical History:   Diagnosis Date    Back pain     Coronary artery disease     Depression     Diabetes mellitus     Hypertension     Mixed hyperlipidemia      Past Surgical History:   Procedure Laterality Date    APPENDECTOMY      CHOLECYSTECTOMY      CORONARY ANGIOPLASTY WITH STENT PLACEMENT      TOTAL KNEE ARTHROPLASTY       No family history on file.  Social History     Tobacco Use    Smoking status: Never    Smokeless tobacco: Never   Substance Use Topics    Alcohol use: Not Currently     Review of Systems   Constitutional: Negative.    HENT:  Positive for hearing loss.    Respiratory: Negative.     Cardiovascular: Negative.    Gastrointestinal: Negative.    Musculoskeletal:  Positive for back pain.   Skin: Negative.    Neurological: Negative.         Memory loss   All other systems reviewed and are negative.    Physical Exam     Initial Vitals   BP Pulse  Resp Temp SpO2   10/23/22 2012 10/23/22 2012 10/23/22 2012 10/23/22 2016 10/23/22 2012   (!) 158/83 109 20 97.8 °F (36.6 °C) 95 %      MAP       --                Physical Exam    Nursing note and vitals reviewed.  Constitutional: She appears well-developed and well-nourished.   HENT:   Head: Normocephalic.       Large lacerations/flap to her nose with bleeding   Eyes: EOM are normal. Pupils are equal, round, and reactive to light.   Neck: Neck supple.   Normal range of motion.  Cardiovascular:  Normal rate, regular rhythm, normal heart sounds and intact distal pulses.           Pulmonary/Chest: Breath sounds normal.   Abdominal: Abdomen is soft. Bowel sounds are normal.   Musculoskeletal:         General: Normal range of motion.      Cervical back: Normal range of motion and neck supple.     Neurological: She is alert and oriented to person, place, and time. She has normal strength.   Skin: Skin is warm and dry.       ED Course   Lac Repair    Date/Time: 10/24/2022 1:23 AM  Performed by: Abhi De Jesus MD  Authorized by: Abhi De Jesus MD     Consent:     Consent obtained:  Verbal    Consent given by:  Patient    Risks, benefits, and alternatives were discussed: yes      Risks discussed:  Infection, need for additional repair, poor wound healing and poor cosmetic result    Alternatives discussed:  Delayed treatment and referral  Universal protocol:     Procedure explained and questions answered to patient or proxy's satisfaction: yes      Patient identity confirmed:  Verbally with patient  Anesthesia:     Anesthesia method:  Local infiltration    Local anesthetic:  Lidocaine 1% w/o epi  Laceration details:     Location:  Face    Face location:  Nose (Complex laceration running from the right nasal labial fold just medial to the tear duct up to the nasal bridge and down the left nasal labial fold)    Length (cm):  6    Depth (mm):  3  Pre-procedure details:     Preparation:  Patient was prepped and draped in usual  sterile fashion  Exploration:     Hemostasis achieved with:  Tied off vessels (Used 4-0 Vicryl to tie off and exposed artery, bleeding controlled)    Wound extent: vascular damage      Contaminated: no    Treatment:     Area cleansed with:  Povidone-iodine    Amount of cleaning:  Standard    Irrigation solution:  Sterile saline    Irrigation method:  Syringe    Visualized foreign bodies/material removed: no      Debridement:  None  Skin repair:     Repair method:  Sutures    Suture size:  3-0    Suture material:  Nylon    Suture technique:  Simple interrupted    Number of sutures:  16  Approximation:     Approximation:  Close  Repair type:     Repair type:  Intermediate  Post-procedure details:     Dressing:  Non-adherent dressing and sterile dressing    Procedure completion:  Tolerated well, no immediate complications  Labs Reviewed - No data to display       Imaging Results              CT Head Without Contrast (Final result)  Result time 10/23/22 21:00:36      Final result by Gabriel Osborne MD (10/23/22 21:00:36)                   Impression:      1. No acute intracranial abnormality.  Changes of microangiopathy and age-appropriate volume loss.  2. Bilateral comminuted nasal bone fractures with deviation as well as fracture of the maxillary sinus on the right as well as maxilla.  Hemosinus is identified.      Electronically signed by: Gabriel Osborne MD  Date:    10/23/2022  Time:    21:00               Narrative:    EXAMINATION:  CT HEAD WITHOUT CONTRAST    CLINICAL HISTORY:  Head trauma, moderate-severe;    TECHNIQUE:  Low dose axial CT images obtained throughout the head without intravenous contrast. Sagittal and coronal reconstructions were performed.    COMPARISON:  None.    FINDINGS:  Intracranial compartment:    Ventricles and sulci are normal in size for age without evidence of hydrocephalus. No extra-axial blood or fluid collections.    The brain parenchyma appears normal. No parenchymal mass, hemorrhage,  edema or major vascular distribution infarct.    Skull/extracranial contents (limited evaluation): Comminuted fracture of the nasal bones are identified with deviation of the nose to the left.  Likely fracture of the medial and lateral maxillary sinuses.  The orbital walls appear grossly intact.  Likely complex fracture of the anterior aspect of the right maxilla is identified.  Hemosinus is noted.  Mastoid air cells and paranasal sinuses are essentially clear.                                       Medications   Tdap (BOOSTRIX) vaccine injection 0.5 mL (0.5 mLs Intramuscular Given 10/23/22 2019)   oxymetazoline 0.05 % nasal spray 1 spray (1 spray Each Nostril Given 10/23/22 2254)   traMADoL tablet 50 mg (50 mg Oral Given 10/23/22 2254)                     Patient Vitals for the past 24 hrs:   BP Temp Temp src Pulse Resp SpO2   10/23/22 2257 -- -- -- 62 -- 95 %   10/23/22 2254 -- -- -- -- 16 --   10/23/22 2157 134/66 -- -- (!) 57 -- (!) 93 %   10/23/22 2016 -- 97.8 °F (36.6 °C) Oral -- -- --   10/23/22 2012 (!) 158/83 -- -- 109 20 95 %   Patient and daughter understand the need to follow-up with ENT tomorrow and possibly ocular plastics due to the area involved near the tear duct of the right eye       The patient is resting comfortably and in no acute distress.  She states that her symptoms have improved after treatment in Emergency Department. I personally discussed her test results and treatment plan.  Gave strict ED precautions.  Specific conditions for return to the emergency department and importance of follow up with her primary care provided or the physician listed on the discharge instructions.  Patient voices understanding and agrees to the plan discussed. All patients' questions have been answered at this time.   She has remained hemodynamically stable throughout entire stay in ED and is stable for discharge home.     Clinical Impression:   Final diagnoses:  [S01.21XA] Complex laceration of nose, initial  encounter (Primary)  [S02.2XXA] Closed fracture of nasal bone, initial encounter  [S02.401A] Closed fracture of maxillary sinus, initial encounter        ED Disposition Condition    Discharge Stable          ED Prescriptions       Medication Sig Dispense Start Date End Date Auth. Provider    traMADoL (ULTRAM) 50 mg tablet Take 1 tablet (50 mg total) by mouth every 6 (six) hours as needed for Pain. 20 tablet 10/23/2022 -- Abhi De Jesus MD          Follow-up Information       Follow up With Specialties Details Why Contact Info    Chandra Joyner MD Otolaryngology Call in 1 day Need to see tommorr [address] [facility] [address]  [phone]      [facility] - Emergency Dept Emergency Medicine  As needed [address] [address] [phone]             Abhi De Jesus MD  10/24/22 1838

## 2022-10-25 ENCOUNTER — HOSPITAL ENCOUNTER (OUTPATIENT)
Dept: WOUND CARE | Facility: HOSPITAL | Age: 87
Discharge: HOME | End: 2022-10-25
Attending: PEDIATRICS
Payer: MEDICARE

## 2022-10-25 VITALS
OXYGEN SATURATION: 98 % | HEART RATE: 60 BPM | RESPIRATION RATE: 18 BRPM | DIASTOLIC BLOOD PRESSURE: 75 MMHG | SYSTOLIC BLOOD PRESSURE: 145 MMHG | TEMPERATURE: 98 F

## 2022-10-25 DIAGNOSIS — Z51.89 ENCOUNTER FOR POST-TRAUMATIC WOUND CHECK: ICD-10-CM

## 2022-10-25 DIAGNOSIS — S01.81XD FACIAL LACERATION, SUBSEQUENT ENCOUNTER: ICD-10-CM

## 2022-10-25 DIAGNOSIS — S51.811D SKIN TEAR OF RIGHT FOREARM WITHOUT COMPLICATION, SUBSEQUENT ENCOUNTER: Primary | ICD-10-CM

## 2022-10-25 DIAGNOSIS — S51.811A SKIN TEAR OF RIGHT FOREARM WITHOUT COMPLICATION, INITIAL ENCOUNTER: ICD-10-CM

## 2022-10-25 PROCEDURE — 99213 OFFICE O/P EST LOW 20 MIN: CPT | Mod: ,,, | Performed by: PEDIATRICS

## 2022-10-25 PROCEDURE — 99213 OFFICE O/P EST LOW 20 MIN: CPT

## 2022-10-25 PROCEDURE — 99213 PR OFFICE/OUTPT VISIT, EST, LEVL III, 20-29 MIN: ICD-10-PCS | Mod: ,,, | Performed by: PEDIATRICS

## 2022-10-25 NOTE — PATIENT INSTRUCTIONS
Adaptic and foam applied to R arm wounds today. Leave in place until your next MD visit. Reinforce if needed. Apply adaptic/gauze/soy to face suture line. Change every other day, or until ENT specifies otherwise.   Important to keep scheduled ENT appointment.   Return for MD visit in 1 week for re-assessment of R arm wounds.

## 2022-10-25 NOTE — PROGRESS NOTES
Subjective:       Patient ID: Shanita Chino is a 89 y.o. female.    Chief Complaint: Wound Care (Skin tear to R arm MD visit and re-assessment. ED night of 10/23 for falling at home and hitting face on bricks; facial fractures; sutures and tetanus vaccine in ED. Has scheduled ENT appt for tomorrow 10/26)    HPI  Review of Systems      Objective:      Temp:  [97.5 °F (36.4 °C)]   Pulse:  [60]   Resp:  [18]   BP: (145)/(75)   SpO2:  [98 %]   Physical Exam       Altered Skin Integrity 10/11/22 0926 Right proximal;lower Arm Skin Tear (Active)   10/11/22 0926   Altered Skin Integrity Present on Admission:    Side: Right   Orientation: proximal;lower   Location: Arm   Wound Number:    Is this injury device related?: No   Primary Wound Type: Skin tear   Description of Altered Skin Integrity:    Ankle-Brachial Index:    Pulses:    Removal Indication and Assessment:    Wound Outcome:    (Retired) Wound Length (cm):    (Retired) Wound Width (cm):    (Retired) Depth (cm):    Wound Description (Comments):    Removal Indications:    Wound Image   10/25/22 1057   Dressing Appearance Intact;Dried drainage 10/25/22 1057   Drainage Amount Small 10/25/22 1057   Drainage Characteristics/Odor Serosanguineous 10/25/22 1057   Appearance Pink;Epithelialization 10/25/22 1057   Periwound Area Intact 10/25/22 1057   Wound Edges Defined 10/25/22 1057   Wound Length (cm) 0 cm 10/25/22 1057   Wound Width (cm) 0 cm 10/25/22 1057   Wound Depth (cm) 0 cm 10/25/22 1057   Wound Volume (cm^3) 0 cm^3 10/25/22 1057   Wound Surface Area (cm^2) 0 cm^2 10/25/22 1057   Care Cleansed with:;Sterile normal saline 10/25/22 1057   Dressing Applied;Foam 10/25/22 1057            Altered Skin Integrity 10/11/22 0926 Right lower;distal Arm (Active)   10/11/22 0926   Altered Skin Integrity Present on Admission:    Side: Right   Orientation: lower;distal   Location: Arm   Wound Number:    Is this injury device related?:    Primary Wound Type:    Description of  Altered Skin Integrity:    Ankle-Brachial Index:    Pulses:    Removal Indication and Assessment:    Wound Outcome:    (Retired) Wound Length (cm):    (Retired) Wound Width (cm):    (Retired) Depth (cm):    Wound Description (Comments):    Removal Indications:    Wound Image    10/25/22 1057   Dressing Appearance Intact;Moist drainage 10/25/22 1057   Drainage Amount Small 10/25/22 1057   Drainage Characteristics/Odor Serosanguineous;No odor 10/25/22 1057   Appearance Pink;Red;Epithelialization;Granulating 10/25/22 1057   Red (%), Wound Tissue Color 100 % 10/25/22 1057   Periwound Area Intact 10/25/22 1057   Wound Edges Defined 10/25/22 1057   Wound Length (cm) 0.2 cm 10/25/22 1057   Wound Width (cm) 0.4 cm 10/25/22 1057   Wound Depth (cm) 0.1 cm 10/25/22 1057   Wound Volume (cm^3) 0.008 cm^3 10/25/22 1057   Wound Surface Area (cm^2) 0.08 cm^2 10/25/22 1057   Care Cleansed with:;Sterile normal saline 10/25/22 1057   Dressing Applied;Non-adherent;Collagen;Foam 10/25/22 1057            Altered Skin Integrity 10/25/22 1056 Face Laceration (Active)   10/25/22 1056   Altered Skin Integrity Present on Admission:    Side:    Orientation:    Location: Face   Wound Number:    Is this injury device related?:    Primary Wound Type: Laceration   Description of Altered Skin Integrity:    Ankle-Brachial Index:    Pulses:    Removal Indication and Assessment:    Wound Outcome:    (Retired) Wound Length (cm):    (Retired) Wound Width (cm):    (Retired) Depth (cm):    Wound Description (Comments):    Removal Indications:    Wound Image   10/25/22 1057   Dressing Appearance Intact;Dried drainage 10/25/22 1057   Drainage Amount Scant 10/25/22 1057   Drainage Characteristics/Odor Sanguineous;Bleeding controlled 10/25/22 1057   Appearance Sutures intact 10/25/22 1057   Periwound Area Ecchymotic;Edematous 10/25/22 1057   Wound Edges Approximated 10/25/22 1057   Wound Length (cm) 4.5 cm 10/25/22 1057   Wound Width (cm) 5 cm 10/25/22 1058    Wound Surface Area (cm^2) 22.5 cm^2 10/25/22 1057   Care Cleansed with:;Sterile normal saline 10/25/22 1057   Dressing Applied;Non-adherent;Gauze;Rolled gauze 10/25/22 1057         Assessment:     Today patient returns for evaluation of skin avulsion of the right arm.  Today all areas of closed except for a very tiny area on the right wrist.  This appears to be epithelialized.  Plan will be to clean placed foam bandage.  Patient will be checked in 1 week.  Patient was also seen in the emergency room on 10/23/2022 after having a fall on her Porch hitting her face.  Patient fractures of the maxillary bone and her nasal bones is a large laceration of her face 4.5 cm x 5 cm and she had several sutures applied.  Today patient has good EOM.  The wound was cleaned and Adaptic applied with bandage.  Patient will see ENT tomorrow.    ICD-10-CM ICD-9-CM   1. Skin tear of right forearm without complication, subsequent encounter  S51.811D V58.89     881.00   2. Skin tear of right forearm without complication, initial encounter  S51.811A 881.00   3. Encounter for post-traumatic wound check  Z51.89 V58.89   4. Facial laceration, subsequent encounter  S01.81XD V58.89     873.40         Plan:   Tissue pathology and/or culture taken:  [] Yes [x] No   Sharp debridement performed:   [] Yes [x] No   Labs ordered this visit:   [] Yes [x] No   Imaging ordered this visit:   [] Yes [x] No           Orders Placed This Encounter   Procedures    Change dressing     Cleanse wound with: wound cleanser, NS or soap and water  Lidocaine: 2% topical gel prn prior to wound care   Silver nitrate: prn   Periwound care: skin prep periwound prn   Primary dressing: aquacel ag  Secondary dressing: foam dressing    Frequency: change twice weekly     Standing Status:   Standing     Number of Occurrences:   1        Follow up in about 1 week (around 11/1/2022) for MD Visit .

## 2022-11-01 ENCOUNTER — HOSPITAL ENCOUNTER (OUTPATIENT)
Dept: WOUND CARE | Facility: HOSPITAL | Age: 87
Discharge: HOME | End: 2022-11-01
Attending: PEDIATRICS
Payer: MEDICARE

## 2022-11-01 VITALS
HEART RATE: 55 BPM | OXYGEN SATURATION: 98 % | RESPIRATION RATE: 18 BRPM | DIASTOLIC BLOOD PRESSURE: 71 MMHG | SYSTOLIC BLOOD PRESSURE: 166 MMHG | TEMPERATURE: 97 F

## 2022-11-01 DIAGNOSIS — S51.811A SKIN TEAR OF RIGHT FOREARM WITHOUT COMPLICATION, INITIAL ENCOUNTER: ICD-10-CM

## 2022-11-01 PROCEDURE — 99213 OFFICE O/P EST LOW 20 MIN: CPT

## 2022-11-01 PROCEDURE — 99213 OFFICE O/P EST LOW 20 MIN: CPT | Mod: ,,, | Performed by: PEDIATRICS

## 2022-11-01 PROCEDURE — 99213 PR OFFICE/OUTPT VISIT, EST, LEVL III, 20-29 MIN: ICD-10-PCS | Mod: ,,, | Performed by: PEDIATRICS

## 2022-11-01 NOTE — PROGRESS NOTES
Subjective:       Patient ID: Shanita Chino is a 89 y.o. female.    Chief Complaint: Wound Care (R arm wounds; Facial trauma MD visit and re-assessment )    HPI  Review of Systems      Objective:      Temp:  [97.3 °F (36.3 °C)]   Pulse:  [55]   Resp:  [18]   BP: (166)/(71)   SpO2:  [98 %]   Physical Exam       Altered Skin Integrity 10/11/22 0926 Right proximal;lower Arm Skin Tear (Active)   10/11/22 0926   Altered Skin Integrity Present on Admission:    Side: Right   Orientation: proximal;lower   Location: Arm   Wound Number:    Is this injury device related?: No   Primary Wound Type: Skin tear   Description of Altered Skin Integrity:    Ankle-Brachial Index:    Pulses:    Removal Indication and Assessment:    Wound Outcome:    (Retired) Wound Length (cm):    (Retired) Wound Width (cm):    (Retired) Depth (cm):    Wound Description (Comments):    Removal Indications:    Wound Image   11/01/22 1053   Dressing Appearance Intact;Dry 11/01/22 1053   Drainage Amount None 11/01/22 1053   Appearance Epithelialization;Closed/resurfaced 11/01/22 1053   Periwound Area Intact 11/01/22 1053   Wound Edges Defined 11/01/22 1053   Wound Length (cm) 0 cm 11/01/22 1053   Wound Width (cm) 0 cm 11/01/22 1053   Wound Depth (cm) 0 cm 11/01/22 1053   Wound Volume (cm^3) 0 cm^3 11/01/22 1053   Wound Surface Area (cm^2) 0 cm^2 11/01/22 1053   Care Cleansed with:;Sterile normal saline 11/01/22 1053   Dressing Applied;Foam 11/01/22 1053            Altered Skin Integrity 10/11/22 0926 Right lower;distal Arm (Active)   10/11/22 0926   Altered Skin Integrity Present on Admission:    Side: Right   Orientation: lower;distal   Location: Arm   Wound Number:    Is this injury device related?:    Primary Wound Type:    Description of Altered Skin Integrity:    Ankle-Brachial Index:    Pulses:    Removal Indication and Assessment:    Wound Outcome:    (Retired) Wound Length (cm):    (Retired) Wound Width (cm):    (Retired) Depth (cm):    Wound  Description (Comments):    Removal Indications:    Wound Image    11/01/22 1053   Dressing Appearance Intact;Dry 11/01/22 1053   Drainage Amount None 11/01/22 1053   Appearance Closed/resurfaced;Epithelialization 11/01/22 1053   Periwound Area Intact 11/01/22 1053   Wound Edges Defined 11/01/22 1053   Wound Length (cm) 0 cm 11/01/22 1053   Wound Width (cm) 0 cm 11/01/22 1053   Wound Depth (cm) 0 cm 11/01/22 1053   Wound Volume (cm^3) 0 cm^3 11/01/22 1053   Wound Surface Area (cm^2) 0 cm^2 11/01/22 1053   Care Cleansed with:;Sterile normal saline 11/01/22 1053   Dressing Applied;Foam 11/01/22 1053            Altered Skin Integrity 10/25/22 1056 Face Laceration (Active)   10/25/22 1056   Altered Skin Integrity Present on Admission:    Side:    Orientation:    Location: Face   Wound Number:    Is this injury device related?:    Primary Wound Type: Laceration   Description of Altered Skin Integrity:    Ankle-Brachial Index:    Pulses:    Removal Indication and Assessment:    Wound Outcome:    (Retired) Wound Length (cm):    (Retired) Wound Width (cm):    (Retired) Depth (cm):    Wound Description (Comments):    Removal Indications:    Wound Image   11/01/22 1053   Dressing Appearance Intact;Moist drainage;Dried drainage 11/01/22 1053   Drainage Amount Scant 11/01/22 1053   Drainage Characteristics/Odor Galan 11/01/22 1053   Appearance Steri-strips intact 11/01/22 1053   Periwound Area Ecchymotic 11/01/22 1053   Wound Edges Approximated 11/01/22 1053   Wound Length (cm) 4.5 cm 11/01/22 1053   Wound Width (cm) 5 cm 11/01/22 1053   Wound Surface Area (cm^2) 22.5 cm^2 11/01/22 1053   Dressing Other (comment) 11/01/22 1053         Assessment:     Today avulsion of the right arm is completely healed.  Good epithelialization.  No evidence of induration or abscess or cellulitis.  Area was cleaned and foam bandages applied.  Patient will leave these in place for the next week.  Patient is being seen by ENT for her facial  laceration and broken bones.  ENT remove sutures last week and today the wound has Steri-Strips and appears to be healing well.  ENT will follow for this.  Patient is to call if any questions and return if any problems and is discharged from clinic.    ICD-10-CM ICD-9-CM   1. Skin tear of right forearm without complication, initial encounter  S51.811A 881.00         Plan:   Tissue pathology and/or culture taken:  [] Yes [x] No   Sharp debridement performed:   [] Yes [x] No   Labs ordered this visit:   [] Yes [x] No   Imaging ordered this visit:   [] Yes [x] No           Orders Placed This Encounter   Procedures    Change dressing     Foam dressing to R arm wounds for an additional week. Facial laceration with steri-strips in place; following ENT for wound. Return to clinic as needed     Standing Status:   Standing     Number of Occurrences:   1     Standing Expiration Date:   11/1/2022        Follow up if symptoms worsen or fail to improve.

## 2022-11-01 NOTE — PATIENT INSTRUCTIONS
R arm wounds are still healed. Foam dressing applied to sites. May remove after additional week.   Important to keep scheduled ENT appointment.   You have been discharged from wound care clinic. Call if you have any additional questions or problems.

## 2022-12-06 ENCOUNTER — HOSPITAL ENCOUNTER (EMERGENCY)
Facility: HOSPITAL | Age: 87
Discharge: HOME | End: 2022-12-06
Attending: EMERGENCY MEDICINE
Payer: MEDICARE

## 2022-12-06 VITALS
RESPIRATION RATE: 18 BRPM | SYSTOLIC BLOOD PRESSURE: 133 MMHG | BODY MASS INDEX: 20.49 KG/M2 | HEIGHT: 64 IN | DIASTOLIC BLOOD PRESSURE: 81 MMHG | OXYGEN SATURATION: 95 % | HEART RATE: 67 BPM | TEMPERATURE: 97 F | WEIGHT: 120 LBS

## 2022-12-06 DIAGNOSIS — R01.1 HEART MURMUR: ICD-10-CM

## 2022-12-06 DIAGNOSIS — D32.9 MENINGIOMA: ICD-10-CM

## 2022-12-06 DIAGNOSIS — R55 SYNCOPE, UNSPECIFIED SYNCOPE TYPE: Primary | ICD-10-CM

## 2022-12-06 LAB
ALBUMIN SERPL-MCNC: 3.9 GM/DL (ref 3.4–4.8)
ALBUMIN/GLOB SERPL: 1.1 RATIO (ref 1.1–2)
ALP SERPL-CCNC: 111 UNIT/L (ref 40–150)
ALT SERPL-CCNC: 14 UNIT/L (ref 0–55)
APPEARANCE UR: ABNORMAL
AST SERPL-CCNC: 29 UNIT/L (ref 5–34)
BACTERIA #/AREA URNS AUTO: NORMAL /HPF
BASOPHILS # BLD AUTO: 0.02 X10(3)/MCL (ref 0–0.2)
BASOPHILS NFR BLD AUTO: 0.2 %
BILIRUB UR QL STRIP.AUTO: NEGATIVE MG/DL
BILIRUBIN DIRECT+TOT PNL SERPL-MCNC: 0.9 MG/DL
BUN SERPL-MCNC: 20.1 MG/DL (ref 9.8–20.1)
CALCIUM SERPL-MCNC: 9.3 MG/DL (ref 8.4–10.2)
CHLORIDE SERPL-SCNC: 105 MMOL/L (ref 98–111)
CO2 SERPL-SCNC: 21 MMOL/L (ref 23–31)
COLOR UR AUTO: YELLOW
CREAT SERPL-MCNC: 1.08 MG/DL (ref 0.55–1.02)
EOSINOPHIL # BLD AUTO: 0.02 X10(3)/MCL (ref 0–0.9)
EOSINOPHIL NFR BLD AUTO: 0.2 %
ERYTHROCYTE [DISTWIDTH] IN BLOOD BY AUTOMATED COUNT: 14.5 % (ref 11.5–17)
GFR SERPLBLD CREATININE-BSD FMLA CKD-EPI: 49 MLS/MIN/1.73/M2
GLOBULIN SER-MCNC: 3.5 GM/DL (ref 2.4–3.5)
GLUCOSE SERPL-MCNC: 169 MG/DL (ref 75–121)
GLUCOSE UR QL STRIP.AUTO: NEGATIVE MG/DL
HCT VFR BLD AUTO: 47.9 % (ref 37–47)
HGB BLD-MCNC: 14.8 GM/DL (ref 12–16)
IMM GRANULOCYTES # BLD AUTO: 0.01 X10(3)/MCL (ref 0–0.04)
IMM GRANULOCYTES NFR BLD AUTO: 0.1 %
KETONES UR QL STRIP.AUTO: NEGATIVE MG/DL
LEUKOCYTE ESTERASE UR QL STRIP.AUTO: NEGATIVE UNIT/L
LYMPHOCYTES # BLD AUTO: 0.93 X10(3)/MCL (ref 0.6–4.6)
LYMPHOCYTES NFR BLD AUTO: 8.6 %
MCH RBC QN AUTO: 26.4 PG (ref 27–31)
MCHC RBC AUTO-ENTMCNC: 30.9 MG/DL (ref 33–36)
MCV RBC AUTO: 85.5 FL (ref 80–94)
MONOCYTES # BLD AUTO: 0.5 X10(3)/MCL (ref 0.1–1.3)
MONOCYTES NFR BLD AUTO: 4.6 %
NEUTROPHILS # BLD AUTO: 9.3 X10(3)/MCL (ref 2.1–9.2)
NEUTROPHILS NFR BLD AUTO: 86.3 %
NITRITE UR QL STRIP.AUTO: NEGATIVE
PH UR STRIP.AUTO: 5 [PH]
PLATELET # BLD AUTO: 211 X10(3)/MCL (ref 130–400)
PMV BLD AUTO: 11.5 FL (ref 7.4–10.4)
POC TROPONIN: 0.01
POTASSIUM SERPL-SCNC: 4.5 MMOL/L (ref 3.5–5.1)
PROT SERPL-MCNC: 7.4 GM/DL (ref 5.8–7.6)
PROT UR QL STRIP.AUTO: NEGATIVE MG/DL
RBC # BLD AUTO: 5.6 X10(6)/MCL (ref 4.2–5.4)
RBC #/AREA URNS AUTO: NORMAL /HPF
RBC UR QL AUTO: ABNORMAL UNIT/L
SODIUM SERPL-SCNC: 138 MMOL/L (ref 132–146)
SP GR UR STRIP.AUTO: 1.02
SQUAMOUS #/AREA URNS AUTO: NORMAL /HPF
UROBILINOGEN UR STRIP-ACNC: 0.2 MG/DL
WBC # SPEC AUTO: 10.8 X10(3)/MCL (ref 4.5–11.5)
WBC #/AREA URNS AUTO: NORMAL /HPF

## 2022-12-06 PROCEDURE — 81001 URINALYSIS AUTO W/SCOPE: CPT | Performed by: EMERGENCY MEDICINE

## 2022-12-06 PROCEDURE — 81003 URINALYSIS AUTO W/O SCOPE: CPT | Performed by: EMERGENCY MEDICINE

## 2022-12-06 PROCEDURE — 80053 COMPREHEN METABOLIC PANEL: CPT | Performed by: EMERGENCY MEDICINE

## 2022-12-06 PROCEDURE — 85025 COMPLETE CBC W/AUTO DIFF WBC: CPT | Performed by: EMERGENCY MEDICINE

## 2022-12-06 PROCEDURE — 99284 EMERGENCY DEPT VISIT MOD MDM: CPT | Mod: 25

## 2022-12-06 NOTE — ED PROVIDER NOTES
"Encounter Date: 12/6/2022       History     Chief Complaint   Patient presents with    Fatigue     S    Loss of Consciousness     PT STATES THAT SHE STARTED TO FEEL WEAK AFTER GETTING UP FROM TOILET. PATIENT SAT ON DRESSER AND WHILE THE PT DAUGHTER WAS GETTING A CHANGE OF CLOTHES THE PT HAD A SYNCOPAL EPISODE. PATIENT APPEARED TO BE "SLEEPING" PER PT DAUGHTER AND COULD NOT BE WOKEN UP. PT'S PCP DR. WREN WAS CONTACTED AND RECOMMENDED GOING TO ER.       This 89-year-old female was brought in by her daughter reports that after getting up from the toilet the patient sat down and had an episode of syncope.  She reports that she closed her eyes and was unresponsive for a period of about 3 minutes.  After responding again she stated that she was weak.  Her daughter states that during the episode she seemed to be having an episode of emesis.  Though she did not vomit.  She had no sweating, chest pain, or shortness of breath.  She denies having any prior such episodes.  She denies any cardiac history.       Review of patient's allergies indicates:   Allergen Reactions    Ace inhibitors     Aspirin     Chlordiazepoxide hcl     Citalopram analogues     Diphenhydramine hcl     Hydrochlorothiazide     Hydrochlorothiazide (bulk)     Losartan     Mirabegron      Other reaction(s): Dizziness    Phenobarbital     Spironolactone     Trazodone      Other reaction(s): Dizziness     Past Medical History:   Diagnosis Date    Back pain     Coronary artery disease     Depression     Diabetes mellitus     Hypertension     Mixed hyperlipidemia      Past Surgical History:   Procedure Laterality Date    APPENDECTOMY      CHOLECYSTECTOMY      CORONARY ANGIOPLASTY WITH STENT PLACEMENT      TOTAL KNEE ARTHROPLASTY       No family history on file.  Social History     Tobacco Use    Smoking status: Never    Smokeless tobacco: Never   Substance Use Topics    Alcohol use: Not Currently     Review of Systems   Constitutional:  Negative for fever. "   HENT:  Negative for sore throat.    Respiratory:  Negative for shortness of breath.    Cardiovascular:  Negative for chest pain.   Gastrointestinal:  Negative for nausea.   Genitourinary:  Negative for dysuria.   Musculoskeletal:  Negative for back pain.   Skin:  Negative for rash.   Neurological:  Positive for syncope. Negative for weakness.   Hematological:  Does not bruise/bleed easily.     Physical Exam     Initial Vitals [12/06/22 1549]   BP Pulse Resp Temp SpO2   (!) 167/65 (!) 57 18 97.3 °F (36.3 °C) 96 %      MAP       --         Physical Exam    Nursing note and vitals reviewed.  Constitutional: She appears well-developed and well-nourished.   HENT:   Head: Normocephalic and atraumatic.   Eyes: Conjunctivae and EOM are normal. Pupils are equal, round, and reactive to light.   Neck: Neck supple.   Normal range of motion.  Cardiovascular:  Normal rate, regular rhythm and intact distal pulses.           Murmur (3/6 systolic left sternal) heard.  Pulmonary/Chest: Breath sounds normal.   Abdominal: Abdomen is soft. Bowel sounds are normal.   Musculoskeletal:         General: Normal range of motion.      Cervical back: Normal range of motion and neck supple.     Neurological: She is alert and oriented to person, place, and time. She has normal strength.   Skin: Skin is warm and dry. Capillary refill takes less than 2 seconds.   Psychiatric: She has a normal mood and affect. Her behavior is normal. Judgment and thought content normal.       ED Course   Procedures  Labs Reviewed   COMPREHENSIVE METABOLIC PANEL - Abnormal; Notable for the following components:       Result Value    Carbon Dioxide 21 (*)     Glucose Level 169 (*)     Creatinine 1.08 (*)     All other components within normal limits   URINALYSIS, REFLEX TO URINE CULTURE - Abnormal; Notable for the following components:    Appearance, UA Slightly Cloudy (*)     Blood, UA Small (*)     All other components within normal limits   CBC WITH DIFFERENTIAL  - Abnormal; Notable for the following components:    RBC 5.60 (*)     Hct 47.9 (*)     MCH 26.4 (*)     MCHC 30.9 (*)     MPV 11.5 (*)     Neut # 9.3 (*)     All other components within normal limits   URINALYSIS, MICROSCOPIC - Normal   CBC W/ AUTO DIFFERENTIAL    Narrative:     The following orders were created for panel order CBC auto differential.  Procedure                               Abnormality         Status                     ---------                               -----------         ------                     CBC with Differential[[id number]]        Abnormal            Final result                 Please view results for these tests on the individual orders.   POCT TROPONIN     EKG Readings: (Independently Interpreted)   Rhythm: Normal Sinus Rhythm. Heart Rate: 68. Ectopy: PACs. Conduction: RBBB.   Bifascicular block; moderate voltage criteria for LVH maybe normal variant     Imaging Results              CT Head Without Contrast (Final result)  Result time 12/06/22 18:43:53      Final result by Aakash Bell MD (12/06/22 18:43:53)                   Impression:      Chronic age-related changes.    Old is a bone fracture    Questionable meningioma in the left frontal parietal region which is stable since previous examinations.      Electronically signed by: Aakash Bell  Date:    12/06/2022  Time:    18:43               Narrative:    EXAMINATION:  CT HEAD WITHOUT CONTRAST    CLINICAL HISTORY:  Syncope, recurrent;    TECHNIQUE:  Multiple axial images were obtained from the base of the brain to the vertex without contrast administration.  Sagittal and coronal reconstructions were performed..Automatic exposure control is utilized to reduce patient radiation exposure.    COMPARISON:  10/23/2022    FINDINGS:  There is a rounded area of dural based calcification in the left frontal parietal region best seen on image number 22 series 4.  It may represent a calcified meningioma.  It is stable since  "the prior examination..  No hemorrhage is seen.  No acute infarct is seen.  There is some cerebral atrophy seen.  There is some compensatory ventricular dilatation and periventricular white matter changes consistent with the patient's age.  Calvarium is intact.  There is an old fracture of the nasal bone which was seen on the prior examination as well.  The posterior fossa is unremarkable..  The visualized portions of the paranasal sinuses show no acute abnormality.                                       Medications - No data to display  Medical Decision Making:   Differential Diagnosis:   Anemia; Viral illness; Dehydration; Electrolyte abnormality ; vasovagal episode  Clinical Tests:   Lab Tests: Reviewed  Radiological Study: Ordered and Reviewed  ED Management:  I reviewed the lab test and radiological studies and discussed with the patient and her daughter; I discussed following up with a cardiologist due to the heart murmur and the syncopal episode; they are in agreement; patient feels like she is at her baseline and has no further dizziness or presyncopal symptoms           ED Course as of 12/06/22 2307   Tue Dec 06, 2022   1800 I, Dr. De Jesus, assumed care of this patient at 6:00 p.m. from Dr. Badillo [DD]      ED Course User Index  [DD] Abhi De Jesus MD        Patient Vitals for the past 24 hrs:   BP Temp Temp src Pulse Resp SpO2 Height Weight   12/06/22 1901 133/81 -- -- 67 18 95 % -- --   12/06/22 1816 -- -- -- 64 -- -- -- --   12/06/22 1759 (!) 121/55 -- -- (!) 58 20 (!) 94 % -- --   12/06/22 1725 (!) 154/58 -- -- 71 18 (!) 94 % -- --   12/06/22 1550 -- -- -- -- -- 99 % -- --   12/06/22 1549 (!) 167/65 97.3 °F (36.3 °C) Oral (!) 57 18 96 % 5' 4" (1.626 m) 54.4 kg (120 lb)          The patient is resting comfortably and in no acute distress.  She states that her symptoms have improved after treatment in Emergency Department. I personally discussed her test results and treatment plan.  Gave strict ED " precautions.  Specific conditions for return to the emergency department and importance of follow up with her primary care provided or the physician listed on the discharge instructions.  Patient voices understanding and agrees to the plan discussed. All patients' questions have been answered at this time.   She has remained hemodynamically stable throughout entire stay in ED and is stable for discharge home.     Clinical Impression:   Final diagnoses:  [R55] Syncope, unspecified syncope type (Primary)  [R01.1] Heart murmur  [D32.9] Meningioma      ED Disposition Condition    Discharge Stable          ED Prescriptions    None       Follow-up Information       Follow up With Specialties Details Why Contact Info    Godfrey Browne MD Internal Medicine In 3 months Regarding meningioma [address]  [address]  [facility] [address]  [phone]      Catherine Zhong MD Cardiovascular Disease, Cardiology Schedule an appointment as soon as possible for a visit in 1 week syncope; Heart murmur [address]  [facility] [place]  [place] [address]  [phone]               Abhi De Jesus MD  12/06/22 6076

## 2022-12-07 NOTE — ED NOTES
Pt AAOx4, reviewed discharge instructions with pt and daughter, stated understanding, discharge instructions given to pt, pt taken out to vehicle in wheelchair

## 2022-12-08 LAB
POC CARDIAC TROPONIN I: 0.01 NG/ML (ref 0–0.08)
SAMPLE: NORMAL

## 2023-06-16 ENCOUNTER — HOSPITAL ENCOUNTER (OUTPATIENT)
Facility: HOSPITAL | Age: 88
Discharge: HOME HEALTH | End: 2023-06-18
Attending: FAMILY MEDICINE | Admitting: INTERNAL MEDICINE
Payer: MEDICARE
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
## 2023-06-16 DIAGNOSIS — I73.9 PAD (PERIPHERAL ARTERY DISEASE): ICD-10-CM

## 2023-06-16 DIAGNOSIS — R53.1 WEAKNESS: ICD-10-CM
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
## 2023-06-16 DIAGNOSIS — R55 SYNCOPE: ICD-10-CM

## 2023-06-16 LAB
ANION GAP SERPL CALC-SCNC: 9 MEQ/L
APPEARANCE UR: CLEAR
BACTERIA #/AREA URNS AUTO: ABNORMAL /HPF
BASOPHILS # BLD AUTO: 0.02 X10(3)/MCL
BASOPHILS NFR BLD AUTO: 0.3 %
BILIRUB UR QL STRIP.AUTO: ABNORMAL MG/DL
BSA FOR ECHO PROCEDURE: 1.77 M2
BUN SERPL-MCNC: 18.8 MG/DL (ref 9.8–20.1)
CALCIUM SERPL-MCNC: 9.3 MG/DL (ref 8.4–10.2)
CHLORIDE SERPL-SCNC: 108 MMOL/L (ref 98–111)
CO2 SERPL-SCNC: 25 MMOL/L (ref 23–31)
COLOR UR: YELLOW
CREAT SERPL-MCNC: 0.93 MG/DL (ref 0.55–1.02)
CREAT/UREA NIT SERPL: 20
EJECTION FRACTION: 55 %
EOSINOPHIL # BLD AUTO: 0.06 X10(3)/MCL (ref 0–0.9)
EOSINOPHIL NFR BLD AUTO: 0.9 %
ERYTHROCYTE [DISTWIDTH] IN BLOOD BY AUTOMATED COUNT: 14.3 % (ref 11.5–17)
GFR SERPLBLD CREATININE-BSD FMLA CKD-EPI: 58 MLS/MIN/1.73/M2
GLUCOSE SERPL-MCNC: 145 MG/DL (ref 75–121)
GLUCOSE UR QL STRIP.AUTO: NEGATIVE MG/DL
HCT VFR BLD AUTO: 48.4 % (ref 37–47)
HGB BLD-MCNC: 15.2 G/DL (ref 12–16)
IMM GRANULOCYTES # BLD AUTO: 0.01 X10(3)/MCL (ref 0–0.04)
IMM GRANULOCYTES NFR BLD AUTO: 0.2 %
KETONES UR QL STRIP.AUTO: NEGATIVE MG/DL
LEUKOCYTE ESTERASE UR QL STRIP.AUTO: NEGATIVE UNIT/L
LYMPHOCYTES # BLD AUTO: 0.8 X10(3)/MCL (ref 0.6–4.6)
LYMPHOCYTES NFR BLD AUTO: 12.5 %
MAGNESIUM SERPL-MCNC: 2 MG/DL (ref 1.6–2.6)
MCH RBC QN AUTO: 26.7 PG (ref 27–31)
MCHC RBC AUTO-ENTMCNC: 31.4 G/DL (ref 33–36)
MCV RBC AUTO: 85.1 FL (ref 80–94)
MONOCYTES # BLD AUTO: 0.41 X10(3)/MCL (ref 0.1–1.3)
MONOCYTES NFR BLD AUTO: 6.4 %
MUCOUS THREADS URNS QL MICRO: ABNORMAL /LPF
NEUTROPHILS # BLD AUTO: 5.1 X10(3)/MCL (ref 2.1–9.2)
NEUTROPHILS NFR BLD AUTO: 79.7 %
NITRITE UR QL STRIP.AUTO: NEGATIVE
PH UR STRIP.AUTO: 5 [PH]
PLATELET # BLD AUTO: 193 X10(3)/MCL (ref 130–400)
PMV BLD AUTO: 9.6 FL (ref 7.4–10.4)
POC CARDIAC TROPONIN I: 0.01 NG/ML (ref 0–0.08)
POTASSIUM SERPL-SCNC: 3.6 MMOL/L (ref 3.5–5.1)
PROT UR QL STRIP.AUTO: 30 MG/DL
RBC # BLD AUTO: 5.69 X10(6)/MCL (ref 4.2–5.4)
RBC #/AREA URNS AUTO: ABNORMAL /HPF
RBC UR QL AUTO: ABNORMAL UNIT/L
SAMPLE: NORMAL
SODIUM SERPL-SCNC: 142 MMOL/L (ref 132–146)
SP GR UR STRIP.AUTO: >=1.03
SQUAMOUS #/AREA URNS AUTO: ABNORMAL /HPF
TSH SERPL-ACNC: 2.22 UIU/ML (ref 0.35–4.94)
UROBILINOGEN UR STRIP-ACNC: 0.2 MG/DL
WBC # SPEC AUTO: 6.4 X10(3)/MCL (ref 4.5–11.5)
WBC #/AREA URNS AUTO: ABNORMAL /HPF

## 2023-06-16 PROCEDURE — 80048 BASIC METABOLIC PNL TOTAL CA: CPT | Performed by: FAMILY MEDICINE

## 2023-06-16 PROCEDURE — 84443 ASSAY THYROID STIM HORMONE: CPT | Performed by: FAMILY MEDICINE

## 2023-06-16 PROCEDURE — 83735 ASSAY OF MAGNESIUM: CPT | Performed by: FAMILY MEDICINE

## 2023-06-16 PROCEDURE — 99285 EMERGENCY DEPT VISIT HI MDM: CPT | Mod: 25

## 2023-06-16 PROCEDURE — 25000003 PHARM REV CODE 250: Performed by: FAMILY MEDICINE

## 2023-06-16 PROCEDURE — 81001 URINALYSIS AUTO W/SCOPE: CPT | Performed by: FAMILY MEDICINE

## 2023-06-16 PROCEDURE — G0378 HOSPITAL OBSERVATION PER HR: HCPCS

## 2023-06-16 PROCEDURE — 93010 ELECTROCARDIOGRAM REPORT: CPT | Mod: ,,, | Performed by: INTERNAL MEDICINE

## 2023-06-16 PROCEDURE — 25000003 PHARM REV CODE 250: Performed by: INTERNAL MEDICINE

## 2023-06-16 PROCEDURE — 94760 N-INVAS EAR/PLS OXIMETRY 1: CPT

## 2023-06-16 PROCEDURE — 96360 HYDRATION IV INFUSION INIT: CPT

## 2023-06-16 PROCEDURE — 93005 ELECTROCARDIOGRAM TRACING: CPT

## 2023-06-16 PROCEDURE — 96361 HYDRATE IV INFUSION ADD-ON: CPT

## 2023-06-16 PROCEDURE — 85025 COMPLETE CBC W/AUTO DIFF WBC: CPT | Performed by: FAMILY MEDICINE

## 2023-06-16 PROCEDURE — 93010 EKG 12-LEAD: ICD-10-PCS | Mod: ,,, | Performed by: INTERNAL MEDICINE

## 2023-06-16 PROCEDURE — S5010 5% DEXTROSE AND 0.45% SALINE: HCPCS | Performed by: FAMILY MEDICINE

## 2023-06-16 RX ORDER — ACETAMINOPHEN 500 MG
1000 TABLET ORAL EVERY 6 HOURS PRN
Status: DISCONTINUED | OUTPATIENT
Start: 2023-06-16 | End: 2023-06-18 | Stop reason: HOSPADM

## 2023-06-16 RX ORDER — ONDANSETRON 2 MG/ML
4 INJECTION INTRAMUSCULAR; INTRAVENOUS EVERY 6 HOURS PRN
Status: DISCONTINUED | OUTPATIENT
Start: 2023-06-16 | End: 2023-06-18 | Stop reason: HOSPADM

## 2023-06-16 RX ORDER — TRAMADOL HYDROCHLORIDE 50 MG/1
50 TABLET ORAL EVERY 6 HOURS PRN
Status: DISCONTINUED | OUTPATIENT
Start: 2023-06-16 | End: 2023-06-18 | Stop reason: HOSPADM

## 2023-06-16 RX ORDER — TALC
6 POWDER (GRAM) TOPICAL NIGHTLY PRN
Status: DISCONTINUED | OUTPATIENT
Start: 2023-06-16 | End: 2023-06-18 | Stop reason: HOSPADM

## 2023-06-16 RX ORDER — DEXTROSE MONOHYDRATE AND SODIUM CHLORIDE 5; .45 G/100ML; G/100ML
INJECTION, SOLUTION INTRAVENOUS CONTINUOUS
Status: DISCONTINUED | OUTPATIENT
Start: 2023-06-16 | End: 2023-06-17

## 2023-06-16 RX ORDER — MORPHINE SULFATE 4 MG/ML
1 INJECTION, SOLUTION INTRAMUSCULAR; INTRAVENOUS EVERY 6 HOURS PRN
Status: DISCONTINUED | OUTPATIENT
Start: 2023-06-16 | End: 2023-06-18 | Stop reason: HOSPADM

## 2023-06-16 RX ORDER — HYDROCODONE BITARTRATE AND ACETAMINOPHEN 5; 325 MG/1; MG/1
1 TABLET ORAL EVERY 6 HOURS PRN
Status: DISCONTINUED | OUTPATIENT
Start: 2023-06-16 | End: 2023-06-18 | Stop reason: HOSPADM

## 2023-06-16 RX ORDER — AMOXICILLIN 250 MG
2 CAPSULE ORAL 2 TIMES DAILY PRN
Status: DISCONTINUED | OUTPATIENT
Start: 2023-06-16 | End: 2023-06-18 | Stop reason: HOSPADM

## 2023-06-16 RX ORDER — SODIUM CHLORIDE 0.9 % (FLUSH) 0.9 %
10 SYRINGE (ML) INJECTION
Status: DISCONTINUED | OUTPATIENT
Start: 2023-06-16 | End: 2023-06-18 | Stop reason: HOSPADM

## 2023-06-16 RX ORDER — ACETAMINOPHEN 325 MG/1
325 TABLET ORAL EVERY 6 HOURS PRN
Status: DISCONTINUED | OUTPATIENT
Start: 2023-06-16 | End: 2023-06-18 | Stop reason: HOSPADM

## 2023-06-16 RX ORDER — TALC
3 POWDER (GRAM) TOPICAL NIGHTLY
Status: DISCONTINUED | OUTPATIENT
Start: 2023-06-16 | End: 2023-06-18 | Stop reason: HOSPADM

## 2023-06-16 RX ADMIN — MELATONIN TAB 3 MG 3 MG: 3 TAB at 09:06

## 2023-06-16 RX ADMIN — DEXTROSE AND SODIUM CHLORIDE: 5; 450 INJECTION, SOLUTION INTRAVENOUS at 04:06

## 2023-06-16 RX ADMIN — SODIUM CHLORIDE 250 ML: 9 INJECTION, SOLUTION INTRAVENOUS at 01:06

## 2023-06-16 NOTE — PLAN OF CARE
[facility]  Initial Discharge Assessment       Primary Care Provider: Godfrey Browne MD    Admission Diagnosis: Syncope [R55]  Weakness [R53.1]  Fall [W19.XXXA]  Fall, initial encounter [W19.XXXA]    Admission Date: 6/16/2023  Expected Discharge Date:     Transition of Care Barriers: None    Payor: MEDICARE / Plan: MEDICARE PART A & B / Product Type: Government /     Extended Emergency Contact Information  Primary Emergency Contact: Becky Montgomery  Mobile Phone: [phone]  Relation: Daughter  Preferred language: English   needed? No  Secondary Emergency Contact: Charlene Hutchinson  Mobile Phone: [phone]  Relation: Daughter  Preferred language: English   needed? No    Discharge Plan A: Home with family, Home Health         [address] [address]  Phone: [phone] Fax: [phone]      Initial Assessment (most recent)       Adult Discharge Assessment - 06/16/23 1545          Discharge Assessment    Assessment Type Discharge Planning Assessment     Confirmed/corrected address, phone number and insurance Yes     Confirmed Demographics Correct on Facesheet     Source of Information family     If unable to respond/provide information was family/caregiver contacted? Yes     Contact Name/Number Becky Montgomery daughter [phone]     Does patient/caregiver understand observation status Yes     Communicated MONICO with patient/caregiver Date not available/Unable to determine     Reason For Admission Fall     People in Home alone     Facility Arrived From: home     Do you expect to return to your current living situation? Yes     Do you have help at home or someone to help you manage your care at home? Yes     Who are your caregiver(s) and their phone number(s)? Becky Montgomery daughter [phone]     Prior to hospitilization cognitive status: Alert/Oriented     Current  cognitive status: Alert/Oriented     Walking or Climbing Stairs ambulation difficulty, requires equipment     Mobility Management walker     Home Accessibility wheelchair accessible     Home Layout Able to live on 1st floor     Equipment Currently Used at Home walker, rolling     Readmission within 30 days? No     Patient currently being followed by outpatient case management? No     Do you currently have service(s) that help you manage your care at home? No     Do you take prescription medications? Yes     Do you have prescription coverage? Yes     Do you have any problems affording any of your prescribed medications? TBD     Is the patient taking medications as prescribed? yes     Who is going to help you get home at discharge? Becky Montgomery daughter [phone]     How do you get to doctors appointments? family or friend will provide     Are you on dialysis? No     Do you take coumadin? No     Discharge Plan A Home with family;Home Health     DME Needed Upon Discharge  bedside commode     Discharge Plan discussed with: Adult children     Transition of Care Barriers None        Physical Activity    On average, how many days per week do you engage in moderate to strenuous exercise (like a brisk walk)? 0 days     On average, how many minutes do you engage in exercise at this level? 0 min        Financial Resource Strain    How hard is it for you to pay for the very basics like food, housing, medical care, and heating? Not hard at all        Housing Stability    In the last 12 months, was there a time when you were not able to pay the mortgage or rent on time? No     In the last 12 months, how many places have you lived? 1     In the last 12 months, was there a time when you did not have a steady place to sleep or slept in a shelter (including now)? No        Transportation Needs    In the past 12 months, has lack of transportation kept you from medical appointments or from getting medications? No     In the past 12  months, has lack of transportation kept you from meetings, work, or from getting things needed for daily living? No        Food Insecurity    Within the past 12 months, you worried that your food would run out before you got the money to buy more. Never true     Within the past 12 months, the food you bought just didn't last and you didn't have money to get more. Never true        Stress    Do you feel stress - tense, restless, nervous, or anxious, or unable to sleep at night because your mind is troubled all the time - these days? Only a little        Social Connections    In a typical week, how many times do you talk on the phone with family, friends, or neighbors? More than three times a week     How often do you get together with friends or relatives? More than three times a week     How often do you attend Baptism or Yarsanism services? 1 to 4 times per year     Do you belong to any clubs or organizations such as Baptism groups, unions, fraternal or athletic groups, or school groups? No     How often do you attend meetings of the clubs or organizations you belong to? 1 to 4 times per year     Are you , , , , never , or living with a partner?         Alcohol Use    Q1: How often do you have a drink containing alcohol? Never     Q2: How many drinks containing alcohol do you have on a typical day when you are drinking? Patient does not drink     Q3: How often do you have six or more drinks on one occasion? Never

## 2023-06-16 NOTE — ED PROVIDER NOTES
Encounter Date: 6/16/2023       History     Chief Complaint   Patient presents with    Fall     Pt walking in yard and tripped landed on face -c/o nose bleed initially -no bleeding now -abrasion to bridge of nose with slight swelling      89-year-old female according to the daughter was walking in her yd and fainted passed out landed on her face said she did not trip or anything just fainted no chest pain no shortness of breath no signs of seizure activity did hit her nose has some bleeding and swelling on her nose otherwise denies any injuries      Review of patient's allergies indicates:   Allergen Reactions    Ace inhibitors     Aspirin     Chlordiazepoxide hcl     Citalopram analogues     Diphenhydramine hcl     Hydrochlorothiazide     Hydrochlorothiazide (bulk)     Losartan     Mirabegron      Other reaction(s): Dizziness    Phenobarbital     Spironolactone     Trazodone      Other reaction(s): Dizziness     Past Medical History:   Diagnosis Date    Back pain     Coronary artery disease     Depression     Diabetes mellitus     Hypertension     Mixed hyperlipidemia      Past Surgical History:   Procedure Laterality Date    APPENDECTOMY      CHOLECYSTECTOMY      CORONARY ANGIOPLASTY WITH STENT PLACEMENT      TOTAL KNEE ARTHROPLASTY       No family history on file.  Social History     Tobacco Use    Smoking status: Never    Smokeless tobacco: Never   Substance Use Topics    Alcohol use: Not Currently     Review of Systems   Constitutional:  Negative for fever.   HENT:  Negative for sore throat.    Respiratory:  Negative for shortness of breath.    Cardiovascular:  Negative for chest pain.   Gastrointestinal:  Negative for nausea.   Genitourinary:  Negative for dysuria.   Musculoskeletal:  Negative for back pain.   Skin:  Negative for rash.   Neurological:  Positive for syncope. Negative for weakness.   Hematological:  Does not bruise/bleed easily.   All other systems reviewed and are negative.    Physical Exam      Initial Vitals [06/16/23 1231]   BP Pulse Resp Temp SpO2   128/67 78 18 98 °F (36.7 °C) 100 %      MAP       --         Physical Exam    Nursing note and vitals reviewed.  Constitutional: She appears well-developed and well-nourished. She is active.   HENT:   Head: Normocephalic and atraumatic.   Contusion to nose with abrasions   Eyes: Conjunctivae, EOM and lids are normal. Pupils are equal, round, and reactive to light.   Neck: Trachea normal and phonation normal. Neck supple. No thyroid mass present.   Normal range of motion.  Cardiovascular:  Normal rate, regular rhythm, normal heart sounds and normal pulses.           Pulmonary/Chest: Breath sounds normal.   Abdominal: Abdomen is soft. Bowel sounds are normal.   Musculoskeletal:         General: Normal range of motion.      Cervical back: Normal range of motion and neck supple.     Neurological: She is alert and oriented to person, place, and time. She has normal strength and normal reflexes.   Skin: Skin is warm and intact.   Psychiatric: She has a normal mood and affect. Her speech is normal and behavior is normal. Judgment and thought content normal. Cognition and memory are normal.       ED Course   Procedures  Labs Reviewed   BASIC METABOLIC PANEL - Abnormal; Notable for the following components:       Result Value    Glucose Level 145 (*)     All other components within normal limits   CBC WITH DIFFERENTIAL - Abnormal; Notable for the following components:    RBC 5.69 (*)     Hct 48.4 (*)     MCH 26.7 (*)     MCHC 31.4 (*)     All other components within normal limits   MAGNESIUM - Normal   TSH - Normal   CBC W/ AUTO DIFFERENTIAL    Narrative:     The following orders were created for panel order CBC Auto Differential.  Procedure                               Abnormality         Status                     ---------                               -----------         ------                     CBC with Differential[[id number]]        Abnormal             Final result                 Please view results for these tests on the individual orders.   TROPONIN ISTAT   URINALYSIS, REFLEX TO URINE CULTURE   POCT TROPONIN     EKG Readings: (Independently Interpreted)   Initial Reading: No STEMI. Rhythm: Normal Sinus Rhythm. Heart Rate: 65. ST Segments: Normal ST Segments. Clinical Impression: Normal Sinus Rhythm   Sinus rhythm with first-degree AV block, right bundle-branch block, left anterior fascicular block     Imaging Results              X-Ray Chest 1 View (Final result)  Result time 06/16/23 13:18:01      Final result by Saul Singh MD (06/16/23 13:18:01)                   Impression:      No acute disease is seen      Electronically signed by: Saul Singh MD  Date:    06/16/2023  Time:    13:18               Narrative:    EXAMINATION:  XR CHEST 1 VIEW    CLINICAL HISTORY:  Weakness    TECHNIQUE:  Single frontal view of the chest was performed.    COMPARISON:  None    FINDINGS:  No infiltrates are seen.  Heart size is within normal limits.  The costophrenic angles are clear.  There is vascular calcification noted.                                       X-Ray Pelvis Routine AP (Final result)  Result time 06/16/23 13:16:50      Final result by Saul Singh MD (06/16/23 13:16:50)                   Impression:      No acute abnormalities are seen      Electronically signed by: Saul Singh MD  Date:    06/16/2023  Time:    13:16               Narrative:    EXAMINATION:  XR PELVIS ROUTINE AP    CLINICAL HISTORY:  Unspecified fall, initial encounter    TECHNIQUE:  AP view of the pelvis was performed.    COMPARISON:  None.    FINDINGS:  There are no fractures seen.  There is no dislocation.  There are mild degenerative changes in the weight-bearing portion of the acetabulum.                                       CT Cervical Spine Without Contrast (Final result)  Result time 06/16/23 13:12:41      Final result by Saul Singh MD (06/16/23 13:12:41)                    Impression:      Extensive degenerative changes, no acute fractures are seen      Electronically signed by: Saul Singh MD  Date:    06/16/2023  Time:    13:12               Narrative:    EXAMINATION:  CT CERVICAL SPINE WITHOUT CONTRAST    CLINICAL HISTORY:  Fall;    TECHNIQUE:  Low dose axial images, sagittal and coronal reformations were performed though the cervical spine.  Contrast was not administered.    Automatic exposure control (AEC) was utilized for dose reduction.    Dose: 183.92 mGycm    COMPARISON:  07/13/2022    FINDINGS:  There degenerative changes at C3-4 C5-6 and to a lesser extent C6-7.  There is a mild anterolisthesis of C4 on C5.  The odontoid is intact.  No fractures are seen.                                       CT Maxillofacial Without Contrast (Final result)  Result time 06/16/23 13:19:01      Final result by Kvng Luther MD (06/16/23 13:19:01)                   Narrative:    EXAMINATION  CT MAXILLOFACIAL WITHOUT CONTRAST    CLINICAL HISTORY  Nasal fracture suspected;  recent fall    TECHNIQUE  Non-contrast helical-acquisition CT images were obtained and multiplanar reconstructions accomplished by a CT technologist at a separate workstation, pushed to PACS for physician review.    COMPARISON  None available at the time of initial interpretation.    FINDINGS  Images were reviewed in soft tissue and bone windows.    Exam quality: adequate for evaluation    Bones: Acute comminuted, mildly displaced fractures are appreciated the bilateral nasal bones, with leftward deviation.  Mildly buckled fracture of the nasal septum is also evident.  No other convincing acute osseous abnormality is identified.  There is normal and symmetric TMJ alignment.    Orbits: Unremarkable appearance of the intraorbital fat and musculature. Normal, symmetric size and contour of the globes.    Aerodigestive Structures: Clear sinuses, with no mucosal thickening.  Unremarkable appearance of the  naso-/oropharynx.    Other findings: Degenerative changes are visualized throughout the included upper through mid cervical spine.  Intracranial findings are discussed within dedicated report for concomitantly obtained head CT.  Bilateral mastoids are well aerated. There is widespread vascular calcification involving the bilateral cervical and intracranial carotid systems.  Visualized subcutaneous tissues are without acute or focal abnormality.    IMPRESSION  1. Acute fractures of the nasal bones and nasal septum.  2. No other convincing acute maxillofacial abnormality.  3. Chronic secondary details discussed above.    RADIATION DOSE  Automated tube current modulation, weight-based exposure dosing, and/or iterative reconstruction technique utilized to reach lowest reasonably achievable exposure rate.    DLP: 600.5 mGy*cm      Electronically signed by: Kvng Luther  Date:    06/16/2023  Time:    13:19                                     CT Head Without Contrast (Final result)  Result time 06/16/23 13:13:35      Final result by Tanisha Galloway MD (06/16/23 13:13:35)                   Impression:      No appreciable acute intracranial abnormality.    Periventricular and subcortical white matter changes most compatible with chronic small vessel ischemic disease.    Nasal bone fractures      Electronically signed by: Tanisha Galloway  Date:    06/16/2023  Time:    13:13               Narrative:    EXAMINATION:  CT HEAD WITHOUT CONTRAST    CLINICAL HISTORY:  head trauma;    TECHNIQUE:  Low dose axial CT images obtained throughout the head without intravenous contrast.  Axial, sagittal and coronal reconstructions were performed and interpreted.    DLP: 1243 mGycm    All CT scans at this location are performed using dose optimization techniques as appropriate to a performed exam including the following automated exposure control, adjustment of the mA and/or kV according to patient size and/or use of iterative  reconstruction technique    COMPARISON:  CT head 06/16/2023    FINDINGS:  BRAIN: Gray white differentiation is maintained. Periventricular and subcortical white matter changes most compatible with chronic small vessel ischemic disease.  No hemorrhage. No edema. No mass effect or midline shift.  The posterior fossa and midline structures are unremarkable.    VENTRICLES: Normal in size and configuration.    EXTRA-AXIAL: No abnormal extra-axial collections.    BONES: Nasal bone fractures.    SINUSES AND MASTOIDS: Visualized paranasal sinuses and mastoid air cells are clear.                                    X-Rays:   Independently Interpreted Readings:   Other Readings:  All x-rays CT scans reviewed independently only positive finding was fractured nose  Medications   sodium chloride 0.9% bolus 250 mL 250 mL (0 mLs Intravenous Stopped 6/16/23 1405)     Medical Decision Making:   Initial Assessment:   89-year-old female according to the daughter was walking in her yd and fainted passed out landed on her face said she did not trip or anything just fainted no chest pain no shortness of breath no signs of seizure activity did hit her nose has some bleeding and swelling on her nose otherwise denies any injuries    Vital signs were stable no emergent conditions initially discovered on exam physical exam was unremarkable except for some tenderness over the nose and facial area  Differential Diagnosis:   Differential diagnosis syncope cardiac arrhythmias neurological event vasovagal  Clinical Tests:   Lab Tests: Ordered and Reviewed  The following lab test(s) were unremarkable: CBC, BMP, Urinalysis and Troponin  Radiological Study: Ordered and Reviewed  Medical Tests: Ordered and Reviewed  ED Management:  Patient was worked up for syncope episode telemetry monitor lab work x-ray CT scans EKG  Other:   I have discussed this case with another health care provider.       <> Summary of the Discussion: Discussed patient with  hospitalist Dr. Cervantes because of age syncope of unknown cause we will admit to observation put on telemetry           ED Course as of 06/16/23 1414   Fri Jun 16, 2023   1332 Magnesium: 2.00 [BL]   1332 CO2: 25 [BL]   1332 Glucose(!): 145 [BL]   1332 BUN: 18.8 [BL]   1332 Creatinine: 0.93 [BL]   1332 WBC: 6.40 [BL]   1332 Hemoglobin: 15.2 [BL]   1332 Hematocrit(!): 48.4 [BL]   1333 POC Cardiac Troponin I: 0.01 [BL]   1349 Thyroid Stimulating Hormone: 2.220 [BL]      ED Course User Index  [BL] Cem Blackburn MD                 Clinical Impression:   Final diagnoses:  [R55] Syncope  [R53.1] Weakness  [W19.XXXA] Fall  [W19.XXXA] Fall, initial encounter (Primary)        ED Disposition Condition    Observation Stable                Cem Blackburn MD  06/16/23 1412       Cem Blackburn MD  06/16/23 1412       Cem Blackburn MD  06/16/23 1414

## 2023-06-16 NOTE — PLAN OF CARE
ER paged about consult  Chart reviewed and as below.  Patient was not seen by me today, will be seen by my partner tomorrow morning.     89 year old lady with HTN and HLD  Came in after having passed out in the yard    Imaging showed a acute fracture of the nasal bone and nasal septum  Labs are without any acute concerns  Vitals stable    Syncope    Possibly dehydration related? Out in the yard  Check echo, us carotid, keep on telemetry  Obtain orthostatic vitals  Pt and ot  Family clarifying if shes on the meds, they think pcp may have taken her off   Plan for discharge in the morning

## 2023-06-16 NOTE — NURSING
Nurses Note -- 4 Eyes      6/16/2023   5:23 PM      Skin assessed during: Admit      [] No Altered Skin Integrity Present    []Prevention Measures Documented      [] Yes- Altered Skin Integrity Present or Discovered   [] LDA Added if Not in Epic (Describe Wound)   [] New Altered Skin Integrity was Present on Admit and Documented in LDA   [x] Wound Image Taken    Wound Care Consulted? No    Attending Nurse:  Gale Morton RN     Second RN/Staff Member: Maria R Montgomery RN

## 2023-06-17 PROCEDURE — 97165 OT EVAL LOW COMPLEX 30 MIN: CPT

## 2023-06-17 PROCEDURE — 96361 HYDRATE IV INFUSION ADD-ON: CPT

## 2023-06-17 PROCEDURE — 94760 N-INVAS EAR/PLS OXIMETRY 1: CPT

## 2023-06-17 PROCEDURE — 63600175 PHARM REV CODE 636 W HCPCS: Performed by: INTERNAL MEDICINE

## 2023-06-17 PROCEDURE — 89055 LEUKOCYTE ASSESSMENT FECAL: CPT | Performed by: INTERNAL MEDICINE

## 2023-06-17 PROCEDURE — 97161 PT EVAL LOW COMPLEX 20 MIN: CPT

## 2023-06-17 PROCEDURE — 87507 IADNA-DNA/RNA PROBE TQ 12-25: CPT | Performed by: INTERNAL MEDICINE

## 2023-06-17 PROCEDURE — 25000003 PHARM REV CODE 250: Performed by: INTERNAL MEDICINE

## 2023-06-17 PROCEDURE — 87077 CULTURE AEROBIC IDENTIFY: CPT | Performed by: INTERNAL MEDICINE

## 2023-06-17 PROCEDURE — S5010 5% DEXTROSE AND 0.45% SALINE: HCPCS | Performed by: FAMILY MEDICINE

## 2023-06-17 PROCEDURE — 86318 IA INFECTIOUS AGENT ANTIBODY: CPT | Performed by: INTERNAL MEDICINE

## 2023-06-17 PROCEDURE — 25000003 PHARM REV CODE 250: Performed by: FAMILY MEDICINE

## 2023-06-17 PROCEDURE — A4216 STERILE WATER/SALINE, 10 ML: HCPCS | Performed by: FAMILY MEDICINE

## 2023-06-17 PROCEDURE — G0378 HOSPITAL OBSERVATION PER HR: HCPCS

## 2023-06-17 PROCEDURE — 87045 FECES CULTURE AEROBIC BACT: CPT | Performed by: INTERNAL MEDICINE

## 2023-06-17 PROCEDURE — 96372 THER/PROPH/DIAG INJ SC/IM: CPT | Performed by: INTERNAL MEDICINE

## 2023-06-17 RX ORDER — DONEPEZIL HYDROCHLORIDE 5 MG/1
5 TABLET, FILM COATED ORAL DAILY
Status: DISCONTINUED | OUTPATIENT
Start: 2023-06-17 | End: 2023-06-18 | Stop reason: HOSPADM

## 2023-06-17 RX ORDER — ENOXAPARIN SODIUM 100 MG/ML
40 INJECTION SUBCUTANEOUS EVERY 24 HOURS
Status: DISCONTINUED | OUTPATIENT
Start: 2023-06-17 | End: 2023-06-18 | Stop reason: HOSPADM

## 2023-06-17 RX ADMIN — Medication 10 ML: at 09:06

## 2023-06-17 RX ADMIN — DONEPEZIL HYDROCHLORIDE 5 MG: 5 TABLET ORAL at 10:06

## 2023-06-17 RX ADMIN — DEXTROSE AND SODIUM CHLORIDE: 5; 450 INJECTION, SOLUTION INTRAVENOUS at 06:06

## 2023-06-17 RX ADMIN — MELATONIN TAB 3 MG 3 MG: 3 TAB at 09:06

## 2023-06-17 RX ADMIN — ENOXAPARIN SODIUM 40 MG: 40 INJECTION SUBCUTANEOUS at 06:06

## 2023-06-17 NOTE — H&P
[facility] Medicine  History & Physical Examination       Patient: Shanita Chino  MRN: [id number]  STATUS: OP- Observation   DOS: 6/17/2023   PCP: Godfrey Browne MD      CC: Fall       HISTORY OF PRESENT ILLNESS   89 y.o. female with a history that includes HTN, dementia, presented to ED 6/16 following a fall.  Patient was at the salon, when she claims she lost her footing and fell.  Daughter however notes her eye were rolled back she came to her aid.  Patient quickly regained consciousness.  Patient denies this claims, notes she simply feel because she did have her walker.  Work here in ED was unremarkable, but patient was having some diarrhea.     REVIEW OF SYSTEMS   Except as documented, all other systems reviewed and negative       PAST MEDICAL HISTORY     Hypertension   Diabetes mellitus   Mixed hyperlipidemia    Coronary artery disease   Dementia       PAST SURGICAL HISTORY     Appendectomy   Cholecystectomy   Coronary angioplasty with stent placement   Total knee arthroplasty       FAMILY HISTORY   Reviewed, negative in relation to patient's current condition.      SOCIAL HISTORY   Denies any history of alcohol, or tobacco abuse  Lives in private residence alone, but notes that one of her daughters is always with her.  She completes ADLs independently, walks with a walker.      ALLERGIES   Ace inhibitors, Aspirin, Chlordiazepoxide hcl, Citalopram analogues, Diphenhydramine hcl, Hydrochlorothiazide, Hydrochlorothiazide (bulk), Losartan, Mirabegron, Phenobarbital, Spironolactone, and Trazodone      HOME MEDICATIONS      aliskiren (TEKTURNA) 300 mg, Oral, Daily    amLODIPine (NORVASC) 10 MG tablet Take one tablet by mouth daily    donepeziL (ARICEPT) 5 MG tablet Take one tablet by mouth daily       PHYSICAL EXAM   VITALS: T 97.6 °F (36.4 °C)   /72   P 64   RR 18   O2 95 %    GENERAL: Awake and in NAD  HEENT: NC/AT, EOMI, PERRL.  NECK: Supple,  No JVD  LUNGS: CTA anteriorly  CVS: RRR, S1S2  positive  GI/: Soft, NT/ND, bowel sounds positive.  EXTREMITIES: Peripheral pulses 2+, no peripheral edema  DERM: Warm, dry.  No rashes or lesions noted.  NEURO: AAOx3, no focal neurologic deficit  PSYCH: Cooperative, appropriate mood and affect       DIAGNOSTICS     Recent Labs     06/16/23  1301   WBC 6.40   RBC 5.69*   HGB 15.2   HCT 48.4*   MCV 85.1   MCH 26.7*   MCHC 31.4*   RDW 14.3           Recent Labs     06/16/23  1301      K 3.6   CHLORIDE 108   CO2 25   BUN 18.8   CREATININE 0.93   GLUCOSE 145*   CALCIUM 9.3   MG 2.00   TSH 2.220        Echo  · Technically difficult study, No Definity contrast is used in this study.  · The left ventricle is normal in size with concentric hypertrophy and   normal systolic function.  · The estimated ejection fraction is 55%.  · Grade I left ventricular diastolic dysfunction.  · Moderate Aortic valve sclerosis without stenosis.  · Mitral annular calcification is present without significant stenosis.     US Carotid Bilateral  Narrative:  Bilateral carotid plaque in the bulbs and ICAs.  Velocity measurements:  Right ICA peak systolic velocity: 73.8 cm/sec  Right ICA/CCA ratio: 1.3  Left ICA peak systolic velocity: 86.4 cm/sec  Left ICA/CCA ratio: 1.4  Right Vertebral artery: Antegrade flow  Left Vertebral artery: Antegrade flow  Impression:   Bilateral carotid stenosis of less than 50%.  Electronically signed by: Momo Kline  Date:    06/16/2023  Time:    16:46    CT Maxillofacial Without Contrast  FINDINGS  Images were reviewed in soft tissue and bone windows.  Exam quality: adequate for evaluation  Bones: Acute comminuted, mildly displaced fractures are appreciated the bilateral nasal bones, with leftward deviation.  Mildly buckled fracture of the nasal septum is also evident.  No other convincing acute osseous abnormality is identified.  There is normal and symmetric TMJ alignment.  Orbits: Unremarkable appearance of the intraorbital fat and musculature.  Normal, symmetric size and contour of the globes.  Aerodigestive Structures: Clear sinuses, with no mucosal thickening.  Unremarkable appearance of the naso-/oropharynx.  Other findings: Degenerative changes are visualized throughout the included upper through mid cervical spine.  Intracranial findings are discussed within dedicated report for concomitantly obtained head CT.  Bilateral mastoids are well aerated. There is widespread vascular calcification involving the bilateral cervical and intracranial carotid systems.  Visualized subcutaneous tissues are without acute or focal abnormality.  IMPRESSION  1. Acute fractures of the nasal bones and nasal septum.  2. No other convincing acute maxillofacial abnormality.  3. Chronic secondary details discussed above.  Electronically signed by: Kvng Luther  Date:    06/16/2023  Time:    13:19    X-Ray Chest 1 View  Narrative:   No infiltrates are seen.  Heart size is within normal limits.  The costophrenic angles are clear.  There is vascular calcification noted.  Impression:   No acute disease is seen  Electronically signed by: Saul Singh MD  Date:    06/16/2023  Time:    13:18    X-Ray Pelvis Routine AP  Narrative:  There are no fractures seen.  There is no dislocation.  There are mild degenerative changes in the weight-bearing portion of the acetabulum.  Impression:   No acute abnormalities are seen  Electronically signed by: Saul Singh MD  Date:    06/16/2023  Time:    13:16    CT Head Without Contrast  Narrative:   BRAIN: Gray white differentiation is maintained. Periventricular and subcortical white matter changes most compatible with chronic small vessel ischemic disease.  No hemorrhage. No edema. No mass effect or midline shift.  The posterior fossa and midline structures are unremarkable.  VENTRICLES: Normal in size and configuration.  EXTRA-AXIAL: No abnormal extra-axial collections.  BONES: Nasal bone fractures.  SINUSES AND MASTOIDS: Visualized paranasal  sinuses and mastoid air cells are clear.  Impression:   No appreciable acute intracranial abnormality.  Periventricular and subcortical white matter changes most compatible with chronic small vessel ischemic disease.  Nasal bone fractures  Electronically signed by: Tanisha Galloway  Date:    06/16/2023  Time:    13:13    CT Cervical Spine Without Contrast  Narrative:   There degenerative changes at C3-4 C5-6 and to a lesser extent C6-7.  There is a mild anterolisthesis of C4 on C5.  The odontoid is intact.  No fractures are seen.  Impression:   Extensive degenerative changes, no acute fractures are seen  Electronically signed by: Saul Singh MD  Date:    06/16/2023  Time:    13:12        ASSESSMENT   Fall, ? syncope  Acute on chronic diarrhea  Essential HTN    PLAN   Syncope workup unremarkable  Check stool studies, avoid lactose products  PT/OT to eval and treat  Will monitor another night, and follow stool studies  Hold further IV fluids, monitor pressures.  Holding antihypertensives at present    Prophylaxis: SC Lovenox  Code Status: Full      Marc Arreola MD  Hospital Medicine        If the patient has been admitted under observation status, it is at my discretion and under our care with hospital medicine services. [TWA]

## 2023-06-17 NOTE — PT/OT/SLP EVAL
Physical Therapy Obs Evaluation and Treatment    Patient Name: Shanita Chino   MRN: [id number]  Recent Surgery: * No surgery found *      Recommendations:     Discharge Recommendations: home   Discharge Equipment Recommendations: walker, rolling   Barriers to discharge: Ongoing medical treatment    Assessment:     Shanita Chino is a 89 y.o. female admitted with a medical diagnosis of syncope and fall. She presents with the following impairments/functional limitations: impaired endurance, gait instability.     Rehab Prognosis: Good; patient would benefit from acute PT services to address these deficits and reach maximum level of function.    Plan:     During this hospitalization, patient to be seen 6 x/week to address the above listed problems via gait training, therapeutic activities, therapeutic exercises    Plan of Care Expires: 23    Subjective     Chief Complaint: None at the moment  Patient Comments/Goals: go home soon  Pain/Comfort:  Pain Rating 1: 0/10  Pain Rating Post-Intervention 1: 0/10    Social History:  Living Environment: Patient lives with their daughter in a single story home with number of outside stair(s): 2, B rails   Prior Level of Function: Prior to admission, patient was modified independent  Equipment Used at Home: rollator, wheelchair, bedside commode  DME owned (not currently used): wheelchair  Assistance Upon Discharge: family    Objective:     Communicated with OT and nursing prior to session. Patient found HOB elevated with peripheral IV, telemetry upon PT entry to room.    General Precautions: Standard,     Orthopedic Precautions:     Braces:      Respiratory Status: Room air  BP: Sittin/70, Standin/64    Exams:  Cognition: Patient is oriented to Person, Place, Time, Situation  RLE ROM: WNL  RLE Strength: WNL  LLE ROM: WNL  LLE Strength: WNL      Functional Mobility:  Gait belt applied - Yes  Bed Mobility  Rolling Left: stand by assistance  Rolling Right: stand by  assistance  Supine to Sit: stand by assistance for trunk management  Sit to Supine: stand by assistance for LE management  Transfers  Sit to Stand: stand by assistance with rolling walker  Gait  Patient ambulated x 175 feet with a RW and x 50 feet with rollator and stand by assistance. Patient demonstrates mild fatigue and occasional unsteady gait. Pt was more safe with use of RW so DME not being put in. .  Balance  Sitting: supervision  Standing: stand by assistance      Therapeutic Activities and Exercises:   Patient educated on role of acute care PT and PT POC, safety while in hospital including calling nurse for mobility, and call light usage      AM-PAC 6 CLICK MOBILITY  Total Score:23    Patient left HOB elevated with all lines intact, call button in reach, and family present.    GOALS:   Multidisciplinary Problems       Physical Therapy Goals       Not on file                    History:     Past Medical History:   Diagnosis Date    Back pain     Coronary artery disease     Depression     Diabetes mellitus     Hypertension     Mixed hyperlipidemia        Past Surgical History:   Procedure Laterality Date    APPENDECTOMY      CHOLECYSTECTOMY      CORONARY ANGIOPLASTY WITH STENT PLACEMENT      TOTAL KNEE ARTHROPLASTY         Time Tracking:     PT Received On: 06/17/23  PT Start Time: 0830  PT Stop Time: 0858  PT Total Time (min): 28 min     Billable Minutes: Evaluation low complexity     6/17/2023

## 2023-06-17 NOTE — PLAN OF CARE
Problem: Adult Inpatient Plan of Care  Goal: Plan of Care Review  Outcome: Ongoing, Progressing  Flowsheets (Taken 6/16/2023 2130)  Plan of Care Reviewed With:   patient   daughter  Goal: Patient-Specific Goal (Individualized)  Outcome: Ongoing, Progressing  Flowsheets (Taken 6/16/2023 2130)  Anxieties, Fears or Concerns: How long will I have to be here  Individualized Care Needs:   Safety-enhanced safety measures 2 person assist   telemetry monitoring   Intravenous hydration.  Goal: Absence of Hospital-Acquired Illness or Injury  Outcome: Ongoing, Progressing  Intervention: Identify and Manage Fall Risk  Flowsheets (Taken 6/16/2023 2130)  Safety Promotion/Fall Prevention:   bed alarm set   assistive device/personal item within reach   Fall Risk reviewed with patient/family   commode/urinal/bedpan at bedside   family to remain at bedside   medications reviewed   nonskid shoes/socks when out of bed   room near unit station   instructed to call staff for mobility  Intervention: Prevent Skin Injury  Flowsheets (Taken 6/16/2023 2130)  Body Position:   position changed independently   supine   weight shifting  Skin Protection: (Z Guard applied to buttocks, mild redness noted.)   tubing/devices free from skin contact   incontinence pads utilized   skin sealant/moisture barrier applied  Intervention: Prevent and Manage VTE (Venous Thromboembolism) Risk  Flowsheets (Taken 6/16/2023 2130)  Activity Management: Up to bedside commode - L3  VTE Prevention/Management:   remove, assess skin, and reapply sequential compression device   intravenous hydration   dorsiflexion/plantar flexion performed   ROM (active) performed  Range of Motion: active ROM (range of motion) encouraged  Intervention: Prevent Infection  Flowsheets (Taken 6/16/2023 2130)  Infection Prevention:   hand hygiene promoted   personal protective equipment utilized   equipment surfaces disinfected   rest/sleep promoted  Goal: Optimal Comfort and  Wellbeing  Outcome: Ongoing, Progressing  Intervention: Monitor Pain and Promote Comfort  Flowsheets (Taken 6/16/2023 2130)  Pain Management Interventions:   care clustered   pain management plan reviewed with patient/caregiver   medication offered but refused   quiet environment facilitated  Intervention: Provide Person-Centered Care  Flowsheets (Taken 6/16/2023 2130)  Trust Relationship/Rapport:   care explained   choices provided   emotional support provided   empathic listening provided   questions answered   questions encouraged   reassurance provided   thoughts/feelings acknowledged     Problem: Skin Injury Risk Increased  Goal: Skin Health and Integrity  Outcome: Ongoing, Progressing  Intervention: Optimize Skin Protection  Flowsheets (Taken 6/17/2023 0040)  Pressure Reduction Techniques: frequent weight shift encouraged  Skin Protection:   skin sealant/moisture barrier applied   tubing/devices free from skin contact  Head of Bed (HOB) Positioning: HOB at 20-30 degrees

## 2023-06-17 NOTE — PLAN OF CARE
Problem: Adult Inpatient Plan of Care  Goal: Plan of Care Review  Outcome: Ongoing, Progressing  Flowsheets (Taken 6/17/2023 1118)  Plan of Care Reviewed With:   patient   daughter  Goal: Patient-Specific Goal (Individualized)  Outcome: Ongoing, Progressing  Flowsheets (Taken 6/17/2023 1118)  Anxieties, Fears or Concerns: Pt wants to go home  Individualized Care Needs: IV re-hydration, following home routine, assistance with walking, tele monitoring  Goal: Absence of Hospital-Acquired Illness or Injury  Outcome: Ongoing, Progressing  Intervention: Identify and Manage Fall Risk  Flowsheets (Taken 6/17/2023 1118)  Safety Promotion/Fall Prevention:   assistive device/personal item within reach   bed alarm set   diversional activities provided   lighting adjusted   medications reviewed   muscle strengthening facilitated   nonskid shoes/socks when out of bed   instructed to call staff for mobility  Intervention: Prevent Skin Injury  Flowsheets (Taken 6/17/2023 1118)  Body Position: position changed independently  Skin Protection:   adhesive use limited   incontinence pads utilized   skin-to-device areas padded   skin-to-skin areas padded   tubing/devices free from skin contact   transparent dressing maintained  Intervention: Prevent and Manage VTE (Venous Thromboembolism) Risk  Flowsheets (Taken 6/17/2023 1118)  Activity Management: Walk with assistive devise and /or staff member - L3  VTE Prevention/Management:   bleeding risk assessed   bleeding precations maintained   ambulation promoted   ROM (active) performed   fluids promoted   intravenous hydration  Range of Motion: ROM (range of motion) performed  Intervention: Prevent Infection  Flowsheets (Taken 6/17/2023 1118)  Infection Prevention:   cohorting utilized   hand hygiene promoted   single patient room provided  Goal: Optimal Comfort and Wellbeing  Outcome: Ongoing, Progressing  Intervention: Monitor Pain and Promote Comfort  Flowsheets (Taken 6/17/2023  1118)  Pain Management Interventions:   diversional activity provided   prescribed exercises encouraged   quiet environment facilitated   relaxation techniques promoted  Intervention: Provide Person-Centered Care  Flowsheets (Taken 6/17/2023 1118)  Trust Relationship/Rapport:   care explained   choices provided   empathic listening provided   thoughts/feelings acknowledged     Problem: Impaired Wound Healing  Goal: Optimal Wound Healing  Outcome: Ongoing, Progressing  Intervention: Promote Wound Healing  Flowsheets (Taken 6/17/2023 1118)  Oral Nutrition Promotion:   safe use of adaptive equipment encouraged   social interaction promoted  Sleep/Rest Enhancement:   consistent schedule promoted   family presence promoted   relaxation techniques promoted  Activity Management: Walk with assistive devise and /or staff member - L3  Pain Management Interventions:   diversional activity provided   prescribed exercises encouraged   quiet environment facilitated   relaxation techniques promoted     Problem: Skin Injury Risk Increased  Goal: Skin Health and Integrity  Outcome: Ongoing, Progressing  Intervention: Optimize Skin Protection  Flowsheets (Taken 6/17/2023 1118)  Pressure Reduction Techniques:   frequent weight shift encouraged   heels elevated off bed   weight shift assistance provided  Pressure Reduction Devices: positioning supports utilized  Skin Protection:   adhesive use limited   incontinence pads utilized   skin-to-device areas padded   skin-to-skin areas padded   tubing/devices free from skin contact   transparent dressing maintained  Head of Bed (HOB) Positioning: HOB at 30-45 degrees  Intervention: Promote and Optimize Oral Intake  Flowsheets (Taken 6/17/2023 1118)  Oral Nutrition Promotion:   safe use of adaptive equipment encouraged   social interaction promoted     Problem: Fall Injury Risk  Goal: Absence of Fall and Fall-Related Injury  Outcome: Ongoing, Progressing  Intervention: Identify and Manage  Contributors  Flowsheets (Taken 6/17/2023 1118)  Self-Care Promotion:   independence encouraged   BADL personal objects within reach   BADL personal routines maintained   safe use of adaptive equipment encouraged  Medication Review/Management: medications reviewed  Intervention: Promote Injury-Free Environment  Flowsheets (Taken 6/17/2023 1118)  Safety Promotion/Fall Prevention:   assistive device/personal item within reach   bed alarm set   diversional activities provided   lighting adjusted   medications reviewed   muscle strengthening facilitated   nonskid shoes/socks when out of bed   instructed to call staff for mobility     Problem: Dysrhythmia  Goal: Normalized Cardiac Rhythm  Outcome: Ongoing, Progressing  Intervention: Monitor and Manage Cardiac Rhythm Effect  Flowsheets (Taken 6/17/2023 1118)  VTE Prevention/Management:   bleeding risk assessed   bleeding precations maintained   ambulation promoted   ROM (active) performed   fluids promoted   intravenous hydration  Stabilization Measures: airway opened     Problem: Infection  Goal: Absence of Infection Signs and Symptoms  Outcome: Ongoing, Progressing  Intervention: Prevent or Manage Infection  Flowsheets (Taken 6/17/2023 1118)  Fever Reduction/Comfort Measures:   lightweight bedding   lightweight clothing  Infection Management: aseptic technique maintained  Isolation Precautions:   protective   precautions maintained     Problem: Fatigue  Goal: Improved Activity Tolerance  Outcome: Ongoing, Progressing  Intervention: Promote Improved Energy  Flowsheets (Taken 6/17/2023 1118)  Fatigue Management: activity schedule adjusted  Sleep/Rest Enhancement:   consistent schedule promoted   family presence promoted   relaxation techniques promoted  Activity Management: Walk with assistive devise and /or staff member - L3

## 2023-06-17 NOTE — PT/OT/SLP PROGRESS
Name: Shanita Chino    : 1932 (89 y.o.)  MRN: [id number]      Patient is currently performing functional transfers and ambulation at [facility] using a RW. Patient is unable to ambulate safely with current rollator any amount of feet, so PT is recommending use of RW.     DME Recommendations:      Rolling walker: Patient would benefit from a rolling walker upon discharge to increase safety, decrease fall risk, and improve mobility/transfers. Patient is currently unable to independently and functionally walk for household distances of >100 feet without use of rolling walker. Patient would benefit from a rolling walker within her home environment to increase safety with transfers and gait and decrease risk of falls and subsequent injury.

## 2023-06-17 NOTE — PT/OT/SLP EVAL
Occupational Therapy   Acute Care Evaluation    Name: Shanita Chino  MRN: [id number]  Admitting Diagnosis:  fall s/p syncope       History:   Shanita Chino is a 89 y.o. female with a medical diagnosis of fall s/p syncope.     Past Medical History:   Diagnosis Date    Back pain     Coronary artery disease     Depression     Diabetes mellitus     Hypertension     Mixed hyperlipidemia          Past Surgical History:   Procedure Laterality Date    APPENDECTOMY      CHOLECYSTECTOMY      CORONARY ANGIOPLASTY WITH STENT PLACEMENT      TOTAL KNEE ARTHROPLASTY         Subjective     Chief Complaint: no complaints at this time  Patient/Family Comments/goals: Return home    Occupational Profile:  Lives with: alone but daughters alternate staying with her (Pt is never alone)  Home Environment:  home with 4 steps (+) B hand rails  Previous level of function: mod (I) with rollator and ADLs  Equipment Used at Home:  rollator, wheelchair, bedside commode      Pain/Comfort:  Pain Rating 1: 0/10    Blood Pressure: sitting 148/70, standing 138/64 - no symptoms     Objective:     Communicated with: RN prior to session.  Patient found supine with peripheral IV, telemetry upon OT entry to room.    General Precautions: Standard,     Orthopedic Precautions:N/A   Braces: N/A  Respiratory Status: Room air    Occupational Performance:    Mobility  Assist level Comments    Bed mobility independent    Transfer supervision    Functional mobility supervision    Sit to stand transitions supervision    Other:          Activities of Daily Living Assist level Comments    Feeding independent    Grooming/hygiene independent    Bathing supervision    Upper body dressing independent    Lower body dressing supervision    Toileting supervision    Toilet transfer supervision    Adaptive equipment training     Other:       Physical Exam:  Upper Extremity Range of Motion:     -       Right Upper Extremity: WNL  -       Left Upper Extremity: WNL  Upper  Extremity Strength:    -       Right Upper Extremity: WNL  -       Left Upper Extremity: WNL    Cognitive/Visual Perceptual:  Cognitive/Psychosocial Skills:     -       Oriented to: Person, Place, Time, and Situation         Patient left supine with all lines intact, call button in reach, bed alarm on, and daughter present    Assessment:     She presents with decreased endurance. Performance deficits affecting function: impaired endurance, gait instability.      Plan:     Patient to does not warrant OT services d/t baseline level and able to perform all ADL tasks without assist. This will serve as EVAL ONLY.   Plan of Care Reviewed with: patient, daughter        Recommendations:     Discharge Recommendations: home  Discharge Equipment Recommendations:  walker, rolling      Time Tracking:     OT Date of Treatment: 06/17/23  OT Start Time: 0830  OT Stop Time: 0858  OT Total Time (min): 28 min    Billable Minutes:Evaluation 28 min    6/17/2023

## 2023-06-18 VITALS
WEIGHT: 148.56 LBS | HEART RATE: 59 BPM | RESPIRATION RATE: 15 BRPM | TEMPERATURE: 98 F | SYSTOLIC BLOOD PRESSURE: 166 MMHG | HEIGHT: 66 IN | OXYGEN SATURATION: 97 % | BODY MASS INDEX: 23.88 KG/M2 | DIASTOLIC BLOOD PRESSURE: 74 MMHG

## 2023-06-18 PROBLEM — R55 SYNCOPE: Status: ACTIVE | Noted: 2023-06-18
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
## 2023-06-18 LAB
ADV 40+41 DNA STL QL NAA+NON-PROBE: NOT DETECTED
ANION GAP SERPL CALC-SCNC: 8 MEQ/L
ASTRO TYP 1-8 RNA STL QL NAA+NON-PROBE: NOT DETECTED
BUN SERPL-MCNC: 14.3 MG/DL (ref 9.8–20.1)
C CAYETANENSIS DNA STL QL NAA+NON-PROBE: NOT DETECTED
C COLI+JEJ+UPSA DNA STL QL NAA+NON-PROBE: NOT DETECTED
CALCIUM SERPL-MCNC: 8.3 MG/DL (ref 8.4–10.2)
CHLORIDE SERPL-SCNC: 108 MMOL/L (ref 98–111)
CLOSTRIDIUM DIFFICILE GDH ANTIGEN (OHS): NEGATIVE
CLOSTRIDIUM DIFFICILE TOXIN A/B (OHS): NEGATIVE
CO2 SERPL-SCNC: 26 MMOL/L (ref 23–31)
CREAT SERPL-MCNC: 0.94 MG/DL (ref 0.55–1.02)
CREAT/UREA NIT SERPL: 15
CRYPTOSP DNA STL QL NAA+NON-PROBE: NOT DETECTED
E HISTOLYT DNA STL QL NAA+NON-PROBE: NOT DETECTED
EAEC PAA PLAS AGGR+AATA ST NAA+NON-PRB: NOT DETECTED
EC STX1+STX2 GENES STL QL NAA+NON-PROBE: NOT DETECTED
EPEC EAE GENE STL QL NAA+NON-PROBE: NOT DETECTED
ETEC LTA+ST1A+ST1B TOX ST NAA+NON-PROBE: NOT DETECTED
FECAL LEUKOCYTE (OHS): POSITIVE
G LAMBLIA DNA STL QL NAA+NON-PROBE: NOT DETECTED
GFR SERPLBLD CREATININE-BSD FMLA CKD-EPI: 57 MLS/MIN/1.73/M2
GLUCOSE SERPL-MCNC: 100 MG/DL (ref 75–121)
NOROVIRUS GI+II RNA STL QL NAA+NON-PROBE: NOT DETECTED
P SHIGELLOIDES DNA STL QL NAA+NON-PROBE: NOT DETECTED
POTASSIUM SERPL-SCNC: 3.4 MMOL/L (ref 3.5–5.1)
RVA RNA STL QL NAA+NON-PROBE: NOT DETECTED
S ENT+BONG DNA STL QL NAA+NON-PROBE: NOT DETECTED
SAPO I+II+IV+V RNA STL QL NAA+NON-PROBE: NOT DETECTED
SHIGELLA SP+EIEC IPAH ST NAA+NON-PROBE: NOT DETECTED
SODIUM SERPL-SCNC: 142 MMOL/L (ref 132–146)
V CHOL+PARA+VUL DNA STL QL NAA+NON-PROBE: NOT DETECTED
V CHOLERAE DNA STL QL NAA+NON-PROBE: NOT DETECTED
Y ENTEROCOL DNA STL QL NAA+NON-PROBE: NOT DETECTED

## 2023-06-18 PROCEDURE — G0378 HOSPITAL OBSERVATION PER HR: HCPCS

## 2023-06-18 PROCEDURE — 80048 BASIC METABOLIC PNL TOTAL CA: CPT | Performed by: INTERNAL MEDICINE

## 2023-06-18 PROCEDURE — 25000003 PHARM REV CODE 250: Performed by: INTERNAL MEDICINE

## 2023-06-18 RX ORDER — POTASSIUM CHLORIDE 20 MEQ/1
40 TABLET, EXTENDED RELEASE ORAL ONCE
Status: COMPLETED | OUTPATIENT
Start: 2023-06-18 | End: 2023-06-18

## 2023-06-18 RX ORDER — CHOLESTYRAMINE 4 G/9G
4 POWDER, FOR SUSPENSION ORAL 2 TIMES DAILY WITH MEALS
Qty: 180 PACKET | Refills: 3 | Status: SHIPPED | OUTPATIENT
Start: 2023-06-18 | End: 2024-06-17

## 2023-06-18 RX ADMIN — DONEPEZIL HYDROCHLORIDE 5 MG: 5 TABLET ORAL at 08:06

## 2023-06-18 RX ADMIN — POTASSIUM CHLORIDE 40 MEQ: 1500 TABLET, EXTENDED RELEASE ORAL at 11:06

## 2023-06-18 NOTE — DISCHARGE INSTRUCTIONS
Please follow all discharge instructions as given. KEEP ALL FOLLOW UP APPOINTMENTS!           If you experience any worsening or NEW signs or symptoms please call your primary care provider or head to your nearest emergency department.               THANK YOU FOR CHOOSING [facility].  If you have any questions please call [phone].

## 2023-06-18 NOTE — NURSING
Took out IV/tele with catheter intact and pt tolerated well. Gave pt and daughter DC instructions. They verbalized understanding and agreed to keep FU appt and home health set-up that will be made tomorrow. Pt was wheeled via WC by CNA to secured pt vehicle. Pt now off unit.

## 2023-06-18 NOTE — PLAN OF CARE
Problem: Adult Inpatient Plan of Care  Goal: Plan of Care Review  Outcome: Ongoing, Progressing  Flowsheets (Taken 6/18/2023 1023)  Plan of Care Reviewed With:   patient   daughter  Goal: Patient-Specific Goal (Individualized)  Outcome: Ongoing, Progressing  Flowsheets (Taken 6/18/2023 1023)  Anxieties, Fears or Concerns: Patient wants to go home  Individualized Care Needs: Monitor HR, safe transfer to bathroom, contact precaution pending stool sample results  Goal: Absence of Hospital-Acquired Illness or Injury  Outcome: Ongoing, Progressing  Intervention: Identify and Manage Fall Risk  Flowsheets (Taken 6/18/2023 1023)  Safety Promotion/Fall Prevention:   assistive device/personal item within reach   bed alarm set   lighting adjusted   medications reviewed   muscle strengthening facilitated   nonskid shoes/socks when out of bed   instructed to call staff for mobility  Intervention: Prevent Skin Injury  Flowsheets (Taken 6/18/2023 1023)  Body Position: sitting up in bed  Skin Protection:   adhesive use limited   incontinence pads utilized   skin-to-device areas padded   skin-to-skin areas padded   tubing/devices free from skin contact   transparent dressing maintained  Intervention: Prevent and Manage VTE (Venous Thromboembolism) Risk  Flowsheets (Taken 6/18/2023 1023)  Activity Management: Walk with assistive devise and /or staff member - L3  VTE Prevention/Management:   bleeding risk assessed   bleeding precations maintained   ambulation promoted   fluids promoted   ROM (active) performed  Range of Motion: ROM (range of motion) performed  Intervention: Prevent Infection  Flowsheets (Taken 6/18/2023 1023)  Infection Prevention:   cohorting utilized   environmental surveillance performed   single patient room provided  Goal: Optimal Comfort and Wellbeing  Outcome: Ongoing, Progressing  Intervention: Monitor Pain and Promote Comfort  Flowsheets (Taken 6/18/2023 1023)  Pain Management Interventions:   diversional  activity provided   pillow support provided   position adjusted   relaxation techniques promoted   prescribed exercises encouraged  Intervention: Provide Person-Centered Care  Flowsheets (Taken 6/18/2023 1023)  Trust Relationship/Rapport:   care explained   choices provided   empathic listening provided   thoughts/feelings acknowledged     Problem: Impaired Wound Healing  Goal: Optimal Wound Healing  Outcome: Ongoing, Progressing  Intervention: Promote Wound Healing  Flowsheets (Taken 6/18/2023 1023)  Oral Nutrition Promotion:   calorie-dense foods provided   calorie-dense liquids provided   physical activity promoted   social interaction promoted  Sleep/Rest Enhancement:   awakenings minimized   consistent schedule promoted   relaxation techniques promoted   family presence promoted  Activity Management: Walk with assistive devise and /or staff member - L3  Pain Management Interventions:   diversional activity provided   pillow support provided   position adjusted   relaxation techniques promoted   prescribed exercises encouraged     Problem: Skin Injury Risk Increased  Goal: Skin Health and Integrity  Outcome: Ongoing, Progressing  Intervention: Optimize Skin Protection  Flowsheets (Taken 6/18/2023 1023)  Pressure Reduction Techniques:   frequent weight shift encouraged   pressure points protected   weight shift assistance provided   heels elevated off bed  Pressure Reduction Devices: positioning supports utilized  Skin Protection:   adhesive use limited   incontinence pads utilized   skin-to-device areas padded   skin-to-skin areas padded   tubing/devices free from skin contact   transparent dressing maintained  Head of Bed (HOB) Positioning: HOB at 45 degrees  Intervention: Promote and Optimize Oral Intake  Flowsheets (Taken 6/18/2023 1023)  Oral Nutrition Promotion:   calorie-dense foods provided   calorie-dense liquids provided   physical activity promoted   social interaction promoted     Problem: Fall Injury  Risk  Goal: Absence of Fall and Fall-Related Injury  Outcome: Ongoing, Progressing  Intervention: Identify and Manage Contributors  Flowsheets (Taken 6/18/2023 1023)  Self-Care Promotion:   independence encouraged   BADL personal objects within reach   BADL personal routines maintained   safe use of adaptive equipment encouraged  Medication Review/Management: medications reviewed  Intervention: Promote Injury-Free Environment  Flowsheets (Taken 6/18/2023 1023)  Safety Promotion/Fall Prevention:   assistive device/personal item within reach   bed alarm set   lighting adjusted   medications reviewed   muscle strengthening facilitated   nonskid shoes/socks when out of bed   instructed to call staff for mobility     Problem: Dysrhythmia  Goal: Normalized Cardiac Rhythm  Outcome: Ongoing, Progressing  Intervention: Monitor and Manage Cardiac Rhythm Effect  Flowsheets (Taken 6/18/2023 1023)  VTE Prevention/Management:   bleeding risk assessed   bleeding precations maintained   ambulation promoted   fluids promoted   ROM (active) performed  Stabilization Measures: airway opened     Problem: Infection  Goal: Absence of Infection Signs and Symptoms  Outcome: Ongoing, Progressing  Intervention: Prevent or Manage Infection  Flowsheets (Taken 6/18/2023 1023)  Fever Reduction/Comfort Measures:   lightweight bedding   lightweight clothing  Infection Management: aseptic technique maintained  Isolation Precautions:   enhanced contact   precautions maintained     Problem: Fatigue  Goal: Improved Activity Tolerance  Outcome: Ongoing, Progressing  Intervention: Promote Improved Energy  Flowsheets (Taken 6/18/2023 1023)  Fatigue Management: frequent rest breaks encouraged  Sleep/Rest Enhancement:   awakenings minimized   consistent schedule promoted   relaxation techniques promoted   family presence promoted  Activity Management: Walk with assistive devise and /or staff member - L3

## 2023-06-18 NOTE — PLAN OF CARE
Problem: Adult Inpatient Plan of Care  Goal: Plan of Care Review  Outcome: Ongoing, Progressing  Flowsheets (Taken 6/17/2023 2120)  Plan of Care Reviewed With:   patient   daughter  Goal: Patient-Specific Goal (Individualized)  Outcome: Ongoing, Progressing  Flowsheets (Taken 6/17/2023 2120)  Anxieties, Fears or Concerns: Wants to know plan for discharge to home  Individualized Care Needs:   Monitor on telemetry   Safety with mobilizing   monitor for bleeding (started on Lovenox)   Enhanced contact isolation precautions.  Goal: Absence of Hospital-Acquired Illness or Injury  Outcome: Ongoing, Progressing  Intervention: Identify and Manage Fall Risk  Flowsheets (Taken 6/17/2023 2120)  Safety Promotion/Fall Prevention:   bed alarm set   assistive device/personal item within reach   medications reviewed   family to remain at bedside   room near unit station   instructed to call staff for mobility  Intervention: Prevent Skin Injury  Flowsheets (Taken 6/17/2023 2120)  Body Position:   position changed independently   weight shifting   supine  Skin Protection:   tubing/devices free from skin contact   incontinence pads utilized  Intervention: Prevent and Manage VTE (Venous Thromboembolism) Risk  Flowsheets (Taken 6/17/2023 2120)  Activity Management:   Walk with assistive devise and /or staff member - L3   Ambulated to bathroom - L4  VTE Prevention/Management:   bleeding risk assessed   bleeding precations maintained   dorsiflexion/plantar flexion performed   ROM (active) performed   fluids promoted  Range of Motion: active ROM (range of motion) encouraged  Intervention: Prevent Infection  Flowsheets (Taken 6/17/2023 2120)  Infection Prevention:   hand hygiene promoted   personal protective equipment utilized   equipment surfaces disinfected   rest/sleep promoted  Goal: Optimal Comfort and Wellbeing  Outcome: Ongoing, Progressing  Intervention: Monitor Pain and Promote Comfort  Flowsheets (Taken 6/17/2023 2120)  Pain  Management Interventions:   care clustered   pain management plan reviewed with patient/caregiver   quiet environment facilitated   medication offered  Intervention: Provide Person-Centered Care  Flowsheets (Taken 6/17/2023 2120)  Trust Relationship/Rapport:   care explained   choices provided   emotional support provided   empathic listening provided   questions answered   questions encouraged   reassurance provided   thoughts/feelings acknowledged     Problem: Impaired Wound Healing  Goal: Optimal Wound Healing  Outcome: Ongoing, Progressing  Intervention: Promote Wound Healing  Flowsheets (Taken 6/17/2023 2120)  Oral Nutrition Promotion: calorie-dense foods provided  Sleep/Rest Enhancement:   regular sleep/rest pattern promoted   noise level reduced   room darkened   awakenings minimized  Activity Management:   Walk with assistive devise and /or staff member - L3   Ambulated to bathroom - L4  Pain Management Interventions:   care clustered   pain management plan reviewed with patient/caregiver   quiet environment facilitated   medication offered     Problem: Fall Injury Risk  Goal: Absence of Fall and Fall-Related Injury  Outcome: Ongoing, Progressing  Intervention: Identify and Manage Contributors  Flowsheets (Taken 6/17/2023 2120)  Self-Care Promotion: independence encouraged  Medication Review/Management: medications reviewed  Intervention: Promote Injury-Free Environment  Flowsheets (Taken 6/17/2023 2120)  Safety Promotion/Fall Prevention:   bed alarm set   assistive device/personal item within reach   medications reviewed   family to remain at bedside   room near unit station   instructed to call staff for mobility     Problem: Dysrhythmia  Goal: Normalized Cardiac Rhythm  Outcome: Ongoing, Progressing  Intervention: Monitor and Manage Cardiac Rhythm Effect  Flowsheets (Taken 6/17/2023 2120)  VTE Prevention/Management:   bleeding risk assessed   bleeding precations maintained   dorsiflexion/plantar flexion  performed   ROM (active) performed   fluids promoted     Problem: Infection  Goal: Absence of Infection Signs and Symptoms  Outcome: Ongoing, Progressing  Intervention: Prevent or Manage Infection  Flowsheets (Taken 6/17/2023 2120)  Fever Reduction/Comfort Measures:   lightweight clothing   lightweight bedding  Infection Management: aseptic technique maintained  Isolation Precautions:   enhanced contact   precautions maintained     Problem: Fatigue  Goal: Improved Activity Tolerance  Outcome: Ongoing, Progressing  Intervention: Promote Improved Energy  Flowsheets (Taken 6/18/2023 0032)  Fatigue Management:   activity assistance provided   frequent rest breaks encouraged  Sleep/Rest Enhancement:   regular sleep/rest pattern promoted   noise level reduced   room darkened   awakenings minimized  Activity Management: Walk with assistive devise and /or staff member - L3

## 2023-06-19 ENCOUNTER — TELEPHONE (OUTPATIENT)
Dept: HEPATOLOGY | Facility: HOSPITAL | Age: 88
End: 2023-06-19
Payer: MEDICARE

## 2023-06-19 DIAGNOSIS — I10 PRIMARY HYPERTENSION: Primary | ICD-10-CM

## 2023-06-19 NOTE — TELEPHONE ENCOUNTER
I was notified by nurse here today that her stool studies came back positive for Campylobacter.  I called her daughter Becky Montgomery on the phone and spoke to her.  She said that she hasn't had anymore issues with diarrhea since leaving the hospital.    I discussed with her that typically this infection is self limiting and recommendations are not to give antibiotics unless diarrhea with blood, severe diarrhea, fevers, worsening.  Non of which she has at this time and seems it has resolved as it is usually self limiting.      Diagnosis: Campylobacter  Plan- self limiting, diarrhea resolved.  No need for abx at this time.

## 2023-06-19 NOTE — PLAN OF CARE
[facility] - Medical Surgical Unit  Discharge Final Note    Primary Care Provider: Godfrey Browne MD    Expected Discharge Date: 6/18/2023    Final Discharge Note (most recent)       Final Note - 06/19/23 0823          Final Note    Assessment Type Final Discharge Note     Anticipated Discharge Disposition Home-Health Care [facility]     What phone number can be called within the next 1-3 days to see how you are doing after discharge? [phone]     Hospital Resources/Appts/Education Provided Provided patient/caregiver with written discharge plan information        Post-Acute Status    Post-Acute Authorization Home Health     Home Health Status Referrals Sent     Discharge Delays None known at this time                     Important Message from Medicare             Contact Info       Godfrey Browne MD   Specialty: Internal Medicine   Relationship: PCP - General    [address] [place] [address]  [address]  [place] [address]   Phone: [phone]       Next Steps: Go on 6/26/2023    Instructions: Follow up appointment with Godfrey Browne MD on Monday, June 26, 2023 @ 8:00 am.  Spoke to Gabriel and I will call the patient with appointment date and time.  Called and informed the patients daughter, Charlene, of the appointment.

## 2023-06-19 NOTE — PLAN OF CARE
Mary Dean is a 12 month old female infant who presents for her well baby evaluation.    Concerns raised today include none.    There has been no significant reaction with past immunizations. In the household no one is ill. In the household no one is on immunosuppressive drugs (e.g., prednisone); no one has cancer; and no one has an immunodeficiency condition including HIV/AIDS.    REVIEW OF SYSTEMS see HPI; otherwise denies HEENT, NECK, RESP, CARDIAC, GI, , NEURO or PSYCH Sx.    LANGUAGE DEVELOPMENT:          Does Mary look at you when you are speaking?: yes          Does she babble, point or use other gestures?: yes          Does she wave bye-bye?: yes          Does she hear your voice as well as she hears the environment?: yes    Past Medical History:   Diagnosis Date   • Healthy child on routine physical examination        Family History   Problem Relation Age of Onset   • NEGATIVE FAMILY HX OF Mother    • NEGATIVE FAMILY HX OF Father        SOCIAL HISTORY:  Social History   Substance Use Topics   • Smoking status: Not on file   • Smokeless tobacco: Not on file   • Alcohol use Not on file   2016: Lives at home with mom and older siblings    PHYSICAL EXAM  GENERAL: Mary Dean is an alert, vigorous female with appropriate behavior. She is in no acute distress. She does turn and connect when her name is called out.  SKIN: Her skin is warm with normal turgor. The color of the skin is normal. There is no rash. There are no bruises or other signs of injury.  HEAD: The head is atraumatic and normocephalic. The anterior fontanel is open and flat; the posterior fontanel is closed.  EYES: By report Mary Dean is able to see. The eyelids are normal. The exam of the eyes reveals globes that are symmetrical and normal. Both eyes open. The conjunctivae appear normal. There is no excessive tearing. The corneae are clear and of normal diameter. There is no fixed deviation of gaze. Red reflexes are seen bilaterally;  Spoke to patient's daughter, Shirin, who stated that patient would like home health services. Freedom of choice signed for [facility] home care and referral sent via [facility]    no dark spots are visualized.  EARS: By report Mary Dean is able to hear. Exam of the ears reveals the pinnae to be normal. The external auditory canals are clear and the tympanic membranes are normal.  NOSE: There is no nasal flaring.  THROAT: The oropharynx is normal.  TEETH: The teeth are normal.  NECK: The neck is normal. The thyroid is not palpably enlarged.  LYMPH NODES: There are no palpably enlarged lymph nodes in the neck, axillae, or groin.  TRUNK AND THORAX: There are no lesions on the trunk. The thorax is symmetrical and longer in the transverse than in the AP diameter. There are no retractions.  LUNGS: The lung fields are clear to auscultation.  HEART: The precordium is quiet. The heart rhythm is grossly regular. S1 and S2 are normal. The heart tones are strong. There are no murmurs. The femoral pulses are normal.  ABDOMEN: There is not an umbilical hernia. The abdomen is flat and soft. There are no masses. Neither the liver nor the spleen is enlarged. The bowel sounds are normal.  BACK: The back is normal.  GENITALIA: Mary has normal, Bg stage I, infantile, female genitalia with no adhesions of the labia minora. There is no vaginal discharge. No inguinal herniae are present. No hydroceles are present.  EXTREMITIES: The hip exam is normal. The legs are of equal length. There are no hip clicks. The foot exam reveals normal feet. There is no syndactyly. There is no clubbing. There is no edema.  NEUROLOGIC: Mary displays normal tone throughout. She is walking and she has normal reflexes.     ASSESSMENT: Well 12 month old female infant  Normal growth and development  Update immunizations today - MMR, Varicella, Prevnar  The parent was counseled about each component of the vaccine, including possible side effects, risks, and benefits.  Anemia and lead screen    GUIDANCE:      Shoes - DISCUSSED      Sleep practices - DISCUSSED      Speech development - DISCUSSED      Reading / social games -  DISCUSSED      Minor discipline / limit setting - DISCUSSED    PLAN:      Plan per orders      Return for next well infant exam in 3 months.

## 2023-06-21 LAB
BACTERIA STL CULT: ABNORMAL
BACTERIA STL CULT: ABNORMAL

## 2024-02-03 ENCOUNTER — HOSPITAL ENCOUNTER (EMERGENCY)
Facility: HOSPITAL | Age: 89
Discharge: HOME | End: 2024-02-03
Attending: STUDENT IN AN ORGANIZED HEALTH CARE EDUCATION/TRAINING PROGRAM
Payer: MEDICARE

## 2024-02-03 VITALS
WEIGHT: 145 LBS | BODY MASS INDEX: 27.38 KG/M2 | DIASTOLIC BLOOD PRESSURE: 84 MMHG | HEIGHT: 61 IN | HEART RATE: 82 BPM | TEMPERATURE: 97 F | RESPIRATION RATE: 18 BRPM | OXYGEN SATURATION: 93 % | SYSTOLIC BLOOD PRESSURE: 126 MMHG

## 2024-02-03 DIAGNOSIS — K62.5 RECTAL BLEEDING: Primary | ICD-10-CM

## 2024-02-03 LAB
ALBUMIN SERPL-MCNC: 3.9 G/DL (ref 3.4–4.8)
ALBUMIN/GLOB SERPL: 1.1 RATIO (ref 1.1–2)
ALP SERPL-CCNC: 140 UNIT/L (ref 40–150)
ALT SERPL-CCNC: 11 UNIT/L (ref 0–55)
APTT PPP: 24.7 SECONDS (ref 23.2–33.7)
AST SERPL-CCNC: 19 UNIT/L (ref 5–34)
BASOPHILS # BLD AUTO: 0.02 X10(3)/MCL
BASOPHILS NFR BLD AUTO: 0.4 %
BILIRUB SERPL-MCNC: 0.9 MG/DL
BUN SERPL-MCNC: 17.1 MG/DL (ref 9.8–20.1)
CALCIUM SERPL-MCNC: 9 MG/DL (ref 8.4–10.2)
CHLORIDE SERPL-SCNC: 106 MMOL/L (ref 98–111)
CO2 SERPL-SCNC: 22 MMOL/L (ref 23–31)
CREAT SERPL-MCNC: 1.04 MG/DL (ref 0.55–1.02)
EOSINOPHIL # BLD AUTO: 0.17 X10(3)/MCL (ref 0–0.9)
EOSINOPHIL NFR BLD AUTO: 3.4 %
ERYTHROCYTE [DISTWIDTH] IN BLOOD BY AUTOMATED COUNT: 14.5 % (ref 11.5–17)
GFR SERPLBLD CREATININE-BSD FMLA CKD-EPI: 51 MLS/MIN/1.73/M2
GLOBULIN SER-MCNC: 3.7 GM/DL (ref 2.4–3.5)
GLUCOSE SERPL-MCNC: 162 MG/DL (ref 75–121)
HCT VFR BLD AUTO: 49.3 % (ref 37–47)
HEMOCCULT SP1 STL QL: POSITIVE
HGB BLD-MCNC: 15.5 G/DL (ref 12–16)
IMM GRANULOCYTES # BLD AUTO: 0 X10(3)/MCL (ref 0–0.04)
IMM GRANULOCYTES NFR BLD AUTO: 0 %
INR PPP: 1
LYMPHOCYTES # BLD AUTO: 1.51 X10(3)/MCL (ref 0.6–4.6)
LYMPHOCYTES NFR BLD AUTO: 30.3 %
MCH RBC QN AUTO: 26.6 PG (ref 27–31)
MCHC RBC AUTO-ENTMCNC: 31.4 G/DL (ref 33–36)
MCV RBC AUTO: 84.6 FL (ref 80–94)
MONOCYTES # BLD AUTO: 0.49 X10(3)/MCL (ref 0.1–1.3)
MONOCYTES NFR BLD AUTO: 9.8 %
NEUTROPHILS # BLD AUTO: 2.79 X10(3)/MCL (ref 2.1–9.2)
NEUTROPHILS NFR BLD AUTO: 56.1 %
PLATELET # BLD AUTO: 201 X10(3)/MCL (ref 130–400)
PMV BLD AUTO: 10.6 FL (ref 7.4–10.4)
POTASSIUM SERPL-SCNC: 3.7 MMOL/L (ref 3.5–5.1)
PROT SERPL-MCNC: 7.6 GM/DL (ref 5.8–7.6)
PROTHROMBIN TIME: 10.2 SECONDS (ref 12.5–14.5)
RBC # BLD AUTO: 5.83 X10(6)/MCL (ref 4.2–5.4)
SODIUM SERPL-SCNC: 140 MMOL/L (ref 132–146)
WBC # SPEC AUTO: 4.98 X10(3)/MCL (ref 4.5–11.5)

## 2024-02-03 PROCEDURE — 99283 EMERGENCY DEPT VISIT LOW MDM: CPT

## 2024-02-03 PROCEDURE — 82272 OCCULT BLD FECES 1-3 TESTS: CPT | Performed by: STUDENT IN AN ORGANIZED HEALTH CARE EDUCATION/TRAINING PROGRAM

## 2024-02-03 PROCEDURE — 85025 COMPLETE CBC W/AUTO DIFF WBC: CPT | Performed by: STUDENT IN AN ORGANIZED HEALTH CARE EDUCATION/TRAINING PROGRAM

## 2024-02-03 PROCEDURE — 85730 THROMBOPLASTIN TIME PARTIAL: CPT | Performed by: STUDENT IN AN ORGANIZED HEALTH CARE EDUCATION/TRAINING PROGRAM

## 2024-02-03 PROCEDURE — 85610 PROTHROMBIN TIME: CPT | Performed by: STUDENT IN AN ORGANIZED HEALTH CARE EDUCATION/TRAINING PROGRAM

## 2024-02-03 PROCEDURE — 80053 COMPREHEN METABOLIC PANEL: CPT | Performed by: STUDENT IN AN ORGANIZED HEALTH CARE EDUCATION/TRAINING PROGRAM

## 2024-02-03 RX ORDER — PANTOPRAZOLE SODIUM 40 MG/10ML
80 INJECTION, POWDER, LYOPHILIZED, FOR SOLUTION INTRAVENOUS DAILY
Status: DISCONTINUED | OUTPATIENT
Start: 2024-02-04 | End: 2024-02-03

## 2024-02-03 RX ORDER — FAMOTIDINE 20 MG/1
20 TABLET, FILM COATED ORAL DAILY
Qty: 30 TABLET | Refills: 0 | Status: SHIPPED | OUTPATIENT
Start: 2024-02-03 | End: 2024-03-04

## 2024-02-04 NOTE — DISCHARGE INSTRUCTIONS
Continue to closely monitor your stools.  Follow-up with Gastroenterology.  Please see list of attached gastroenterologist.      Follow-up with the primary care physician.  You will need repeat lab work in 2-3 days.    If you have any additional blood in stool, bright red blood, dark stools return to the emergency department immediately.

## 2024-02-04 NOTE — ED PROVIDER NOTES
Encounter Date: 2/3/2024       History     Chief Complaint   Patient presents with    Rectal Bleeding     Noticed today blood in toilet after BM, moderate amount, dark red. History of Hemorrhoids. Denies any N/V, no abd pain.       Patient is a 89-year-old female with past medical history of coronary artery disease, diabetes, not on any anticoagulation presents to emergency department for concern for rectal bleeding.  Patient noticed blood in the toilet earlier tonight.  Patient states she has had hemorrhoids in the past.  States the blood noted to be little bit more than when she just wiped.  Describes it as dark.  Denies any abdominal pain, nausea, vomiting, or any other symptoms.  No other complaints at this time.          Review of patient's allergies indicates:   Allergen Reactions    Ace inhibitors     Aspirin     Chlordiazepoxide hcl     Citalopram analogues     Diphenhydramine hcl     Hydrochlorothiazide     Hydrochlorothiazide (bulk)     Losartan     Mirabegron      Other reaction(s): Dizziness    Phenobarbital     Spironolactone     Trazodone      Other reaction(s): Dizziness     Past Medical History:   Diagnosis Date    Back pain     Coronary artery disease     Depression     Diabetes mellitus     Hypertension     Mixed hyperlipidemia      Past Surgical History:   Procedure Laterality Date    APPENDECTOMY      CHOLECYSTECTOMY      CORONARY ANGIOPLASTY WITH STENT PLACEMENT      TOTAL KNEE ARTHROPLASTY       No family history on file.  Social History     Tobacco Use    Smoking status: Never    Smokeless tobacco: Never   Substance Use Topics    Alcohol use: Not Currently     Review of Systems   Constitutional:  Negative for fever.   HENT:  Negative for sore throat.    Eyes:  Negative for visual disturbance.   Respiratory:  Negative for shortness of breath.    Cardiovascular:  Negative for chest pain.   Gastrointestinal:  Positive for blood in stool. Negative for abdominal pain.   Genitourinary:  Negative for  dysuria.   Musculoskeletal:  Negative for joint swelling.   Skin:  Negative for rash.   Neurological:  Negative for weakness.   Psychiatric/Behavioral:  Negative for confusion.        Physical Exam     Initial Vitals [02/03/24 1913]   BP Pulse Resp Temp SpO2   (!) 177/120 89 18 97.3 °F (36.3 °C) 95 %      MAP       --         Physical Exam    Nursing note and vitals reviewed.  Constitutional: She appears well-developed and well-nourished.   HENT:   Head: Normocephalic and atraumatic.   Eyes: EOM are normal. Pupils are equal, round, and reactive to light.   Neck:   Normal range of motion.  Cardiovascular:  Normal rate, regular rhythm, normal heart sounds and intact distal pulses.           Pulmonary/Chest: Breath sounds normal. No respiratory distress. She has no wheezes. She has no rales.   Abdominal: Abdomen is soft. She exhibits no distension. There is no abdominal tenderness.   Rectal exam:  Female chaperone present, Nasrin CARIAS.  Brown stool noted.  No external hemorrhoids noted.  No internal hemorrhoids palpated. There is no rebound.   Musculoskeletal:         General: No tenderness or edema. Normal range of motion.      Cervical back: Normal range of motion.     Neurological: She is alert. She has normal strength. No cranial nerve deficit. GCS score is 15. GCS eye subscore is 4. GCS verbal subscore is 5. GCS motor subscore is 6.   Skin: Skin is warm and dry. Capillary refill takes less than 2 seconds. No rash noted. No erythema.   Psychiatric: She has a normal mood and affect.         ED Course   Procedures  Labs Reviewed   COMPREHENSIVE METABOLIC PANEL - Abnormal; Notable for the following components:       Result Value    Carbon Dioxide 22 (*)     Glucose Level 162 (*)     Creatinine 1.04 (*)     Globulin 3.7 (*)     All other components within normal limits   PROTIME-INR - Abnormal; Notable for the following components:    PT 10.2 (*)     All other components within normal limits   CBC WITH DIFFERENTIAL -  Abnormal; Notable for the following components:    RBC 5.83 (*)     Hct 49.3 (*)     MCH 26.6 (*)     MCHC 31.4 (*)     MPV 10.6 (*)     All other components within normal limits   OCCULT BLOOD, STOOL 1ST SPECIMEN - Abnormal; Notable for the following components:    Occult Blood Stool 1 Positive (*)     All other components within normal limits   APTT - Normal   CBC W/ AUTO DIFFERENTIAL    Narrative:     The following orders were created for panel order CBC Auto Differential.  Procedure                               Abnormality         Status                     ---------                               -----------         ------                     CBC with Differential[[id number]]        Abnormal            Final result                 Please view results for these tests on the individual orders.   OCCULT BLOOD,STOOL,DIAGNOSTIC (1-3)    Narrative:     The following orders were created for panel order Occult Blood, Stool, Diagnostic (1-3).  Procedure                               Abnormality         Status                     ---------                               -----------         ------                     Occult Blood, Stool 1st ...[[id number]]  Abnormal            Final result               Occult Blood, Stool 2nd ...[[id number]]                                                   Please view results for these tests on the individual orders.   OCCULT BLOOD, STOOL 2ND SPECIMEN          Imaging Results    None          Medications - No data to display    Medical Decision Making  Problems Addressed:  Rectal bleeding: acute illness or injury that poses a threat to life or bodily functions    Amount and/or Complexity of Data Reviewed  Labs: ordered. Decision-making details documented in ED Course.               ED Course as of 02/13/24 1150   Sat Feb 03, 2024 2044 Hemoglobin: 15.5 [RP]   2044 Patient states she would another bowel movement in the emergency department.  There was no blood on this bowel movement per  patient and daughter. [RP]      ED Course User Index  [RP] Corby Cervantes MD               Medical Decision Making:   History:   I obtained history from: someone other than patient.       <> Summary of History: Collateral history per daughter at bedside.  Old Medical Records: I decided to obtain old medical records.  Old Records Summarized: records from clinic visits, records from another hospital and records from previous admission(s).       <> Summary of Records: Reviewed old records, history of hypertension, previous fall  Initial Assessment:   GI bleed  Differential Diagnosis:   Judging by the patient's chief complaint and pertinent history, the patient has the following possible differential diagnoses, including but not limited to the following.  Some of these are deemed to be lower likelihood and some more likely based on my physical exam and history combined with possible lab work and/or imaging studies.   Please see the pertinent studies, and refer to the HPI.  Some of these diagnoses will take further evaluation to fully rule out, perhaps as an outpatient and the patient was encouraged to follow up when discharged for more comprehensive evaluation.    Upper GI bleed: PUD, gastritis, varices, AVM, tumors, erosions, AE fistula  Lower GI bleed: diverticular bleed, colon cancer, AVM, hemorrhoid, AE fistula     Clinical Tests:   Lab Tests: Reviewed and Ordered  ED Management:  Patient is a 89-year-old female accompanied by her daughter with past medical history of high blood pressure, hyperlipidemia presenting for concern for rectal bleeding.  Patient states she has a history of hemorrhoids.  Reports bleeding with 1 bowel movement earlier tonight.  Rectal exam done, gastroccult with brown stool but did test positive for blood.  She was hemodynamically stable.  No tachycardia.  Blood pressure within normal limits.  Her hemoglobin today is 15.  It was well within the normal range.  Patient was observed in the  emergency department, she had a 2nd bowel movement that did not have any evidence of bleeding.  Shared decision making used to determine disposition.  Patient was not on any anticoagulation.  She was of lower risk per Hutchinson lower GI bleeding score.  Her primary risk factors are her advanced age.  Given that she otherwise appears at baseline, reasonable for observation or continued observation at home.  After discussion with patient and family that would prefer to go home.  Discussed that she will need repeat lab work with the primary care physician in few days to ensure no drop in hemoglobin.  I discussed if she began to have several episodes of GI bleeding she will need to return to the emergency department.  Discussed need for follow-up with Gastroenterology.  Discussed return precautions.  Answered all questions at this time.  Hemodynamically stable for continued outpatient management strict return precautions.  Patient and daughter verbalized understanding and agreed to plan.             Clinical Impression:  Final diagnoses:  [K62.5] Rectal bleeding (Primary)          ED Disposition Condition    Discharge Stable          ED Prescriptions       Medication Sig Dispense Start Date End Date Auth. Provider    famotidine (PEPCID) 20 MG tablet Take 1 tablet (20 mg total) by mouth once daily. 30 tablet 2/3/2024 3/4/2024 Corby Cervantes MD          Follow-up Information       Follow up With Specialties Details Why Contact Info    Godfrey Browne MD Internal Medicine   [address]  [address]  [place] [address]  [phone]      Godfrey Browne MD Internal Medicine   [address]  [address]  [place] [address]  [phone]      John Sanchez MD Gastroenterology   [address]  [place] [address]  [phone]      David Flores MD Gastroenterology   [address]  [address]  [place] [address]  [phone]      Noam Brewer MD Emergency Medicine   [address]  [place] [address]  [phone]               Corby Cervantes MD  02/13/24 1153

## 2024-02-07 ENCOUNTER — LAB VISIT (OUTPATIENT)
Dept: LAB | Facility: HOSPITAL | Age: 89
End: 2024-02-07
Attending: INTERNAL MEDICINE
Payer: MEDICARE

## 2024-02-07 DIAGNOSIS — K62.5 HEMORRHAGE OF RECTUM AND ANUS: Primary | ICD-10-CM

## 2024-02-07 LAB
BASOPHILS # BLD AUTO: 0.01 X10(3)/MCL
BASOPHILS NFR BLD AUTO: 0.2 %
EOSINOPHIL # BLD AUTO: 0.07 X10(3)/MCL (ref 0–0.9)
EOSINOPHIL NFR BLD AUTO: 1.5 %
ERYTHROCYTE [DISTWIDTH] IN BLOOD BY AUTOMATED COUNT: 14.6 % (ref 11.5–17)
HCT VFR BLD AUTO: 49.6 % (ref 37–47)
HGB BLD-MCNC: 15.4 G/DL (ref 12–16)
IMM GRANULOCYTES # BLD AUTO: 0.01 X10(3)/MCL (ref 0–0.04)
IMM GRANULOCYTES NFR BLD AUTO: 0.2 %
LYMPHOCYTES # BLD AUTO: 1.16 X10(3)/MCL (ref 0.6–4.6)
LYMPHOCYTES NFR BLD AUTO: 24.8 %
MCH RBC QN AUTO: 26.7 PG (ref 27–31)
MCHC RBC AUTO-ENTMCNC: 31 G/DL (ref 33–36)
MCV RBC AUTO: 86.1 FL (ref 80–94)
MONOCYTES # BLD AUTO: 0.49 X10(3)/MCL (ref 0.1–1.3)
MONOCYTES NFR BLD AUTO: 10.5 %
NEUTROPHILS # BLD AUTO: 2.94 X10(3)/MCL (ref 2.1–9.2)
NEUTROPHILS NFR BLD AUTO: 62.8 %
PLATELET # BLD AUTO: 179 X10(3)/MCL (ref 130–400)
PMV BLD AUTO: 9.6 FL (ref 7.4–10.4)
RBC # BLD AUTO: 5.76 X10(6)/MCL (ref 4.2–5.4)
WBC # SPEC AUTO: 4.68 X10(3)/MCL (ref 4.5–11.5)

## 2024-02-07 PROCEDURE — 36415 COLL VENOUS BLD VENIPUNCTURE: CPT

## 2024-02-07 PROCEDURE — 85025 COMPLETE CBC W/AUTO DIFF WBC: CPT

## 2024-07-18 ENCOUNTER — LAB VISIT (OUTPATIENT)
Dept: LAB | Facility: HOSPITAL | Age: 89
End: 2024-07-18
Attending: INTERNAL MEDICINE
Payer: MEDICARE

## 2024-07-18 DIAGNOSIS — E11.8 DIABETIC COMPLICATION: Primary | ICD-10-CM

## 2024-07-18 LAB
ALBUMIN SERPL-MCNC: 3.8 G/DL (ref 3.4–4.8)
ALBUMIN/GLOB SERPL: 1.2 RATIO (ref 1.1–2)
ALP SERPL-CCNC: 132 UNIT/L (ref 40–150)
ALT SERPL-CCNC: 11 UNIT/L (ref 0–55)
ANION GAP SERPL CALC-SCNC: 8 MEQ/L
AST SERPL-CCNC: 20 UNIT/L (ref 5–34)
BILIRUB SERPL-MCNC: 1.2 MG/DL
BUN SERPL-MCNC: 18.3 MG/DL (ref 9.8–20.1)
CALCIUM SERPL-MCNC: 9.3 MG/DL (ref 8.4–10.2)
CHLORIDE SERPL-SCNC: 106 MMOL/L (ref 98–111)
CHOLEST SERPL-MCNC: 192 MG/DL
CHOLEST/HDLC SERPL: 3 {RATIO} (ref 0–5)
CO2 SERPL-SCNC: 25 MMOL/L (ref 23–31)
CREAT SERPL-MCNC: 1.18 MG/DL (ref 0.55–1.02)
CREAT UR-MCNC: 84.4 MG/DL (ref 45–106)
CREAT/UREA NIT SERPL: 16
ERYTHROCYTE [DISTWIDTH] IN BLOOD BY AUTOMATED COUNT: 13.6 % (ref 11.5–17)
EST. AVERAGE GLUCOSE BLD GHB EST-MCNC: 111.2 MG/DL
GFR SERPLBLD CREATININE-BSD FMLA CKD-EPI: 43 ML/MIN/1.73/M2
GLOBULIN SER-MCNC: 3.3 GM/DL (ref 2.4–3.5)
GLUCOSE SERPL-MCNC: 110 MG/DL (ref 75–121)
HBA1C MFR BLD: 5.5 %
HCT VFR BLD AUTO: 45.8 % (ref 37–47)
HDLC SERPL-MCNC: 67 MG/DL (ref 35–60)
HGB BLD-MCNC: 14.9 G/DL (ref 12–16)
LDLC SERPL CALC-MCNC: 103 MG/DL (ref 50–140)
MCH RBC QN AUTO: 27.6 PG (ref 27–31)
MCHC RBC AUTO-ENTMCNC: 32.5 G/DL (ref 33–36)
MCV RBC AUTO: 85 FL (ref 80–94)
MICROALBUMIN UR-MCNC: 32.7 UG/ML
MICROALBUMIN/CREAT RATIO PNL UR: 38.7 MG/GM CR (ref 0–30)
PLATELET # BLD AUTO: 185 X10(3)/MCL (ref 130–400)
PMV BLD AUTO: 9.5 FL (ref 7.4–10.4)
POTASSIUM SERPL-SCNC: 3.9 MMOL/L (ref 3.5–5.1)
PROT SERPL-MCNC: 7.1 GM/DL (ref 5.8–7.6)
RBC # BLD AUTO: 5.39 X10(6)/MCL (ref 4.2–5.4)
SODIUM SERPL-SCNC: 139 MMOL/L (ref 136–145)
TRIGL SERPL-MCNC: 112 MG/DL (ref 37–140)
VLDLC SERPL CALC-MCNC: 22 MG/DL
WBC # BLD AUTO: 5.04 X10(3)/MCL (ref 4.5–11.5)

## 2024-07-18 PROCEDURE — 82570 ASSAY OF URINE CREATININE: CPT

## 2024-07-18 PROCEDURE — 83036 HEMOGLOBIN GLYCOSYLATED A1C: CPT

## 2024-07-18 PROCEDURE — 85027 COMPLETE CBC AUTOMATED: CPT

## 2024-07-18 PROCEDURE — 80061 LIPID PANEL: CPT

## 2024-07-18 PROCEDURE — 36415 COLL VENOUS BLD VENIPUNCTURE: CPT

## 2024-07-18 PROCEDURE — 80053 COMPREHEN METABOLIC PANEL: CPT

## 2025-08-19 ENCOUNTER — LAB VISIT (OUTPATIENT)
Dept: LAB | Facility: HOSPITAL | Age: OVER 89
End: 2025-08-19
Attending: INTERNAL MEDICINE
Payer: MEDICARE

## 2025-08-19 DIAGNOSIS — N18.32 CHRONIC KIDNEY DISEASE (CKD) STAGE G3B/A1, MODERATELY DECREASED GLOMERULAR FILTRATION RATE (GFR) BETWEEN 30-44 ML/MIN/1.73 SQUARE METER AND ALBUMINURIA CREATININE RATIO LESS THAN 30 MG/G: ICD-10-CM

## 2025-08-19 DIAGNOSIS — E11.8 DIABETIC COMPLICATION: Primary | ICD-10-CM

## 2025-08-19 LAB
ALBUMIN SERPL-MCNC: 3.6 G/DL (ref 3.4–4.8)
ALBUMIN/GLOB SERPL: 0.9 RATIO (ref 1.1–2)
ALP SERPL-CCNC: 128 UNIT/L (ref 40–150)
ALT SERPL-CCNC: 14 UNIT/L (ref 0–55)
ANION GAP SERPL CALC-SCNC: 7 MEQ/L
AST SERPL-CCNC: 22 UNIT/L (ref 11–45)
BILIRUB SERPL-MCNC: 1.3 MG/DL
BUN SERPL-MCNC: 17 MG/DL (ref 9.8–20.1)
CALCIUM SERPL-MCNC: 8.6 MG/DL (ref 8.4–10.2)
CHLORIDE SERPL-SCNC: 110 MMOL/L (ref 98–111)
CO2 SERPL-SCNC: 23 MMOL/L (ref 23–31)
CREAT SERPL-MCNC: 1.06 MG/DL (ref 0.55–1.02)
CREAT/UREA NIT SERPL: 16
ERYTHROCYTE [DISTWIDTH] IN BLOOD BY AUTOMATED COUNT: 14.1 % (ref 11.5–17)
EST. AVERAGE GLUCOSE BLD GHB EST-MCNC: 116.9 MG/DL
GFR SERPLBLD CREATININE-BSD FMLA CKD-EPI: 49 ML/MIN/1.73/M2
GLOBULIN SER-MCNC: 3.8 GM/DL (ref 2.4–3.5)
GLUCOSE SERPL-MCNC: 100 MG/DL (ref 75–121)
HBA1C MFR BLD: 5.7 %
HCT VFR BLD AUTO: 45.5 % (ref 37–47)
HGB BLD-MCNC: 14.9 G/DL (ref 12–16)
MCH RBC QN AUTO: 27.4 PG (ref 27–31)
MCHC RBC AUTO-ENTMCNC: 32.7 G/DL (ref 33–36)
MCV RBC AUTO: 83.6 FL (ref 80–94)
PLATELET # BLD AUTO: 194 X10(3)/MCL (ref 130–400)
PMV BLD AUTO: 9.7 FL (ref 7.4–10.4)
POTASSIUM SERPL-SCNC: 4.3 MMOL/L (ref 3.5–5.1)
PROT SERPL-MCNC: 7.4 GM/DL (ref 5.8–7.6)
RBC # BLD AUTO: 5.44 X10(6)/MCL (ref 4.2–5.4)
SODIUM SERPL-SCNC: 140 MMOL/L (ref 136–145)
WBC # BLD AUTO: 5.17 X10(3)/MCL (ref 4.5–11.5)

## 2025-08-19 PROCEDURE — 83036 HEMOGLOBIN GLYCOSYLATED A1C: CPT

## 2025-08-19 PROCEDURE — 36415 COLL VENOUS BLD VENIPUNCTURE: CPT

## 2025-08-19 PROCEDURE — 80053 COMPREHEN METABOLIC PANEL: CPT

## 2025-08-19 PROCEDURE — 85027 COMPLETE CBC AUTOMATED: CPT

## 2025-08-25 ENCOUNTER — APPOINTMENT (OUTPATIENT)
Dept: LAB | Facility: HOSPITAL | Age: OVER 89
End: 2025-08-25
Attending: INTERNAL MEDICINE
Payer: MEDICARE

## 2025-08-25 DIAGNOSIS — E11.8 DIABETIC COMPLICATION: Primary | ICD-10-CM

## 2025-08-25 LAB
ALBUMIN/CREAT UR: 15.6 MG/GM CR (ref 0–30)
CREAT UR-MCNC: 94.2 MG/DL (ref 45–106)
MICROALBUMIN UR-MCNC: 14.7 UG/ML

## 2025-08-25 PROCEDURE — 82570 ASSAY OF URINE CREATININE: CPT
